# Patient Record
Sex: FEMALE | Race: WHITE | NOT HISPANIC OR LATINO | URBAN - METROPOLITAN AREA
[De-identification: names, ages, dates, MRNs, and addresses within clinical notes are randomized per-mention and may not be internally consistent; named-entity substitution may affect disease eponyms.]

---

## 2017-08-14 ENCOUNTER — EMERGENCY (EMERGENCY)
Facility: HOSPITAL | Age: 32
LOS: 1 days | Discharge: HOME | End: 2017-08-14
Admitting: EMERGENCY MEDICINE
Payer: MEDICARE

## 2017-08-14 VITALS
DIASTOLIC BLOOD PRESSURE: 88 MMHG | SYSTOLIC BLOOD PRESSURE: 129 MMHG | OXYGEN SATURATION: 99 % | TEMPERATURE: 98 F | RESPIRATION RATE: 18 BRPM | HEART RATE: 87 BPM

## 2017-08-14 LAB
ALBUMIN SERPL ELPH-MCNC: 4 G/DL — SIGNIFICANT CHANGE UP (ref 3.4–5)
ALP SERPL-CCNC: 46 U/L — SIGNIFICANT CHANGE UP (ref 40–120)
ALT FLD-CCNC: 15 U/L — SIGNIFICANT CHANGE UP (ref 12–42)
ANION GAP SERPL CALC-SCNC: 4 MMOL/L — LOW (ref 9–16)
APPEARANCE UR: CLEAR — SIGNIFICANT CHANGE UP
AST SERPL-CCNC: 14 U/L — LOW (ref 15–37)
BASOPHILS NFR BLD AUTO: 0.2 % — SIGNIFICANT CHANGE UP (ref 0–2)
BILIRUB SERPL-MCNC: 0.4 MG/DL — SIGNIFICANT CHANGE UP (ref 0.2–1.2)
BILIRUB UR-MCNC: NEGATIVE — SIGNIFICANT CHANGE UP
BUN SERPL-MCNC: 21 MG/DL — SIGNIFICANT CHANGE UP (ref 7–23)
CALCIUM SERPL-MCNC: 9.2 MG/DL — SIGNIFICANT CHANGE UP (ref 8.5–10.5)
CHLORIDE SERPL-SCNC: 104 MMOL/L — SIGNIFICANT CHANGE UP (ref 96–108)
CO2 SERPL-SCNC: 28 MMOL/L — SIGNIFICANT CHANGE UP (ref 22–31)
COLOR SPEC: YELLOW — SIGNIFICANT CHANGE UP
CREAT SERPL-MCNC: 0.84 MG/DL — SIGNIFICANT CHANGE UP (ref 0.5–1.3)
DIFF PNL FLD: NEGATIVE — SIGNIFICANT CHANGE UP
EOSINOPHIL NFR BLD AUTO: 0.3 % — SIGNIFICANT CHANGE UP (ref 0–6)
GLUCOSE SERPL-MCNC: 91 MG/DL — SIGNIFICANT CHANGE UP (ref 70–99)
GLUCOSE UR QL: NEGATIVE — SIGNIFICANT CHANGE UP
HCG UR QL: NEGATIVE — SIGNIFICANT CHANGE UP
HCT VFR BLD CALC: 33.1 % — LOW (ref 34.5–45)
HGB BLD-MCNC: 11.5 G/DL — SIGNIFICANT CHANGE UP (ref 11.5–15.5)
IMM GRANULOCYTES NFR BLD AUTO: 0.3 % — SIGNIFICANT CHANGE UP (ref 0–1.5)
KETONES UR-MCNC: NEGATIVE — SIGNIFICANT CHANGE UP
LEUKOCYTE ESTERASE UR-ACNC: NEGATIVE — SIGNIFICANT CHANGE UP
LYMPHOCYTES # BLD AUTO: 16.4 % — SIGNIFICANT CHANGE UP (ref 13–44)
MCHC RBC-ENTMCNC: 31.9 PG — SIGNIFICANT CHANGE UP (ref 27–34)
MCHC RBC-ENTMCNC: 34.7 G/DL — SIGNIFICANT CHANGE UP (ref 32–36)
MCV RBC AUTO: 91.9 FL — SIGNIFICANT CHANGE UP (ref 80–100)
MONOCYTES NFR BLD AUTO: 7.2 % — SIGNIFICANT CHANGE UP (ref 2–14)
NEUTROPHILS NFR BLD AUTO: 75.6 % — SIGNIFICANT CHANGE UP (ref 43–77)
NITRITE UR-MCNC: NEGATIVE — SIGNIFICANT CHANGE UP
PH UR: 5.5 — SIGNIFICANT CHANGE UP (ref 5–8)
PLATELET # BLD AUTO: 155 K/UL — SIGNIFICANT CHANGE UP (ref 150–400)
POTASSIUM SERPL-MCNC: 3.9 MMOL/L — SIGNIFICANT CHANGE UP (ref 3.5–5.3)
POTASSIUM SERPL-SCNC: 3.9 MMOL/L — SIGNIFICANT CHANGE UP (ref 3.5–5.3)
PROT SERPL-MCNC: 7 G/DL — SIGNIFICANT CHANGE UP (ref 6.4–8.2)
PROT UR-MCNC: NEGATIVE MG/DL — SIGNIFICANT CHANGE UP
RBC # BLD: 3.6 M/UL — LOW (ref 3.8–5.2)
RBC # FLD: 12.3 % — SIGNIFICANT CHANGE UP (ref 10.3–16.9)
SODIUM SERPL-SCNC: 136 MMOL/L — SIGNIFICANT CHANGE UP (ref 132–145)
SP GR SPEC: 1.02 — SIGNIFICANT CHANGE UP (ref 1–1.03)
UROBILINOGEN FLD QL: 0.2 E.U./DL — SIGNIFICANT CHANGE UP
WBC # BLD: 6.1 K/UL — SIGNIFICANT CHANGE UP (ref 3.8–10.5)
WBC # FLD AUTO: 6.1 K/UL — SIGNIFICANT CHANGE UP (ref 3.8–10.5)

## 2017-08-14 PROCEDURE — 99284 EMERGENCY DEPT VISIT MOD MDM: CPT

## 2017-08-14 PROCEDURE — 76705 ECHO EXAM OF ABDOMEN: CPT | Mod: 26

## 2017-08-14 RX ORDER — ONDANSETRON 8 MG/1
4 TABLET, FILM COATED ORAL ONCE
Qty: 0 | Refills: 0 | Status: COMPLETED | OUTPATIENT
Start: 2017-08-14 | End: 2017-08-14

## 2017-08-14 RX ORDER — ONDANSETRON 8 MG/1
1 TABLET, FILM COATED ORAL
Qty: 12 | Refills: 0 | OUTPATIENT
Start: 2017-08-14

## 2017-08-14 RX ORDER — SODIUM CHLORIDE 9 MG/ML
1000 INJECTION INTRAMUSCULAR; INTRAVENOUS; SUBCUTANEOUS ONCE
Qty: 0 | Refills: 0 | Status: COMPLETED | OUTPATIENT
Start: 2017-08-14 | End: 2017-08-14

## 2017-08-14 RX ADMIN — ONDANSETRON 4 MILLIGRAM(S): 8 TABLET, FILM COATED ORAL at 15:16

## 2017-08-14 RX ADMIN — SODIUM CHLORIDE 1000 MILLILITER(S): 9 INJECTION INTRAMUSCULAR; INTRAVENOUS; SUBCUTANEOUS at 15:24

## 2017-08-14 NOTE — ED PROVIDER NOTE - MEDICAL DECISION MAKING DETAILS
Patient is a 33 y/o female with hx of gallstones, chronic GI complaints on liquid diet due to ?dysphagia, presenting to the ED with sudden onset, intermittent RUQ and right flank abdominal pain since this morning with associated nausea. On exam, mild RUQ tenderness and mild right CVA tenderness. Will obtain labs(including LFTs), check urine, and get RUQ US to eval for cholelithiasis vs cholecystitis. Dispo pending workup.

## 2017-08-14 NOTE — ED ADULT NURSE NOTE - OBJECTIVE STATEMENT
Pt reports right flank pain radiating to RUQ and nausea since this am. Pt denies vomiting or diarrhea, denies urinary symptoms

## 2017-08-18 DIAGNOSIS — R10.11 RIGHT UPPER QUADRANT PAIN: ICD-10-CM

## 2017-08-18 DIAGNOSIS — R11.0 NAUSEA: ICD-10-CM

## 2018-06-16 ENCOUNTER — EMERGENCY (EMERGENCY)
Facility: HOSPITAL | Age: 33
LOS: 1 days | Discharge: HOME | End: 2018-06-16
Admitting: EMERGENCY MEDICINE
Payer: MEDICARE

## 2018-06-16 VITALS
SYSTOLIC BLOOD PRESSURE: 110 MMHG | RESPIRATION RATE: 18 BRPM | DIASTOLIC BLOOD PRESSURE: 78 MMHG | HEART RATE: 81 BPM | TEMPERATURE: 98 F | OXYGEN SATURATION: 98 %

## 2018-06-16 PROBLEM — K80.20 CALCULUS OF GALLBLADDER WITHOUT CHOLECYSTITIS WITHOUT OBSTRUCTION: Chronic | Status: ACTIVE | Noted: 2017-08-14

## 2018-06-16 LAB
APPEARANCE UR: CLEAR — SIGNIFICANT CHANGE UP
BILIRUB UR-MCNC: ABNORMAL
COLOR SPEC: YELLOW — SIGNIFICANT CHANGE UP
DIFF PNL FLD: NEGATIVE — SIGNIFICANT CHANGE UP
GLUCOSE UR QL: NEGATIVE — SIGNIFICANT CHANGE UP
HCG UR QL: NEGATIVE — SIGNIFICANT CHANGE UP
KETONES UR-MCNC: NEGATIVE — SIGNIFICANT CHANGE UP
LEUKOCYTE ESTERASE UR-ACNC: ABNORMAL
NITRITE UR-MCNC: NEGATIVE — SIGNIFICANT CHANGE UP
PH UR: 6 — SIGNIFICANT CHANGE UP (ref 5–8)
PROT UR-MCNC: 30 MG/DL
SP GR SPEC: >=1.03 — SIGNIFICANT CHANGE UP (ref 1–1.03)
UROBILINOGEN FLD QL: 1 E.U./DL — SIGNIFICANT CHANGE UP

## 2018-06-16 PROCEDURE — 99283 EMERGENCY DEPT VISIT LOW MDM: CPT

## 2018-06-16 RX ORDER — IPRATROPIUM BROMIDE 0.2 MG/ML
0 SOLUTION, NON-ORAL INHALATION
Qty: 0 | Refills: 0 | COMMUNITY

## 2018-06-16 RX ORDER — GABAPENTIN 400 MG/1
0 CAPSULE ORAL
Qty: 0 | Refills: 0 | COMMUNITY

## 2018-06-16 RX ORDER — CEFUROXIME AXETIL 250 MG
5 TABLET ORAL
Qty: 100 | Refills: 0 | OUTPATIENT
Start: 2018-06-16 | End: 2018-06-25

## 2018-06-16 NOTE — ED PROVIDER NOTE - MUSCULOSKELETAL, MLM
Spine appears normal, range of motion is not limited, no muscle or joint tenderness Spine appears normal, no midline tenderness. no cvat bilaterally

## 2018-06-16 NOTE — ED PROVIDER NOTE - PMH
Asthma    Cerebral palsy    Gall stones    Hypoglycemia    Immunoglobulin A deficiency    Immunoglobulin G deficiency    Small fiber neuropathy

## 2018-06-16 NOTE — ED ADULT TRIAGE NOTE - CHIEF COMPLAINT QUOTE
diagnosed with UTI at [facility] this morning and given rx for cipro liquid. unable to obtain cipro liquid from pharmacy anywhere in city and needs abx changed.

## 2018-06-16 NOTE — ED PROVIDER NOTE - MEDICAL DECISION MAKING DETAILS
Seen at [facility] today, was told she had UTI and started on cipro suspension - unable to locate pharmacy that has this medication in stock, will change rx to ceftin suspension, advised f/u PMD

## 2018-06-16 NOTE — ED ADULT NURSE NOTE - CHIEF COMPLAINT QUOTE
diagnosed with UTI at [facility] this morning and given rx for cipro liquid. unable to obtain cipro liquid from pharmacy anywhere in city and needs abx changed.

## 2018-06-20 DIAGNOSIS — Z76.0 ENCOUNTER FOR ISSUE OF REPEAT PRESCRIPTION: ICD-10-CM

## 2018-06-20 DIAGNOSIS — J45.909 UNSPECIFIED ASTHMA, UNCOMPLICATED: ICD-10-CM

## 2018-06-20 DIAGNOSIS — Z79.2 LONG TERM (CURRENT) USE OF ANTIBIOTICS: ICD-10-CM

## 2018-06-20 DIAGNOSIS — Z88.0 ALLERGY STATUS TO PENICILLIN: ICD-10-CM

## 2018-06-20 DIAGNOSIS — Z79.899 OTHER LONG TERM (CURRENT) DRUG THERAPY: ICD-10-CM

## 2018-06-20 DIAGNOSIS — N39.0 URINARY TRACT INFECTION, SITE NOT SPECIFIED: ICD-10-CM

## 2018-06-20 DIAGNOSIS — Z79.51 LONG TERM (CURRENT) USE OF INHALED STEROIDS: ICD-10-CM

## 2018-10-04 ENCOUNTER — EMERGENCY (EMERGENCY)
Facility: HOSPITAL | Age: 33
LOS: 1 days | Discharge: HOME | End: 2018-10-04
Attending: EMERGENCY MEDICINE | Admitting: EMERGENCY MEDICINE
Payer: MEDICARE

## 2018-10-04 VITALS
DIASTOLIC BLOOD PRESSURE: 78 MMHG | OXYGEN SATURATION: 100 % | TEMPERATURE: 98 F | HEART RATE: 86 BPM | RESPIRATION RATE: 18 BRPM | SYSTOLIC BLOOD PRESSURE: 116 MMHG

## 2018-10-04 DIAGNOSIS — R10.32 LEFT LOWER QUADRANT PAIN: ICD-10-CM

## 2018-10-04 DIAGNOSIS — Z79.899 OTHER LONG TERM (CURRENT) DRUG THERAPY: ICD-10-CM

## 2018-10-04 DIAGNOSIS — R11.0 NAUSEA: ICD-10-CM

## 2018-10-04 DIAGNOSIS — Z88.0 ALLERGY STATUS TO PENICILLIN: ICD-10-CM

## 2018-10-04 DIAGNOSIS — Z88.8 ALLERGY STATUS TO OTHER DRUGS, MEDICAMENTS AND BIOLOGICAL SUBSTANCES STATUS: ICD-10-CM

## 2018-10-04 DIAGNOSIS — K59.00 CONSTIPATION, UNSPECIFIED: ICD-10-CM

## 2018-10-04 PROCEDURE — 99284 EMERGENCY DEPT VISIT MOD MDM: CPT

## 2018-10-05 PROBLEM — G62.9 POLYNEUROPATHY, UNSPECIFIED: Chronic | Status: ACTIVE | Noted: 2018-06-16

## 2018-10-05 PROBLEM — D80.2 SELECTIVE DEFICIENCY OF IMMUNOGLOBULIN A [IGA]: Chronic | Status: ACTIVE | Noted: 2018-06-16

## 2018-10-05 PROBLEM — J45.909 UNSPECIFIED ASTHMA, UNCOMPLICATED: Chronic | Status: ACTIVE | Noted: 2018-06-16

## 2018-10-05 PROBLEM — G80.9 CEREBRAL PALSY, UNSPECIFIED: Chronic | Status: ACTIVE | Noted: 2018-06-16

## 2018-10-05 PROBLEM — E16.2 HYPOGLYCEMIA, UNSPECIFIED: Chronic | Status: ACTIVE | Noted: 2018-06-16

## 2018-10-05 PROBLEM — D80.3 SELECTIVE DEFICIENCY OF IMMUNOGLOBULIN G [IGG] SUBCLASSES: Chronic | Status: ACTIVE | Noted: 2018-06-16

## 2018-10-05 LAB
ALBUMIN SERPL ELPH-MCNC: 4 G/DL — SIGNIFICANT CHANGE UP (ref 3.4–5)
ALP SERPL-CCNC: 61 U/L — SIGNIFICANT CHANGE UP (ref 40–120)
ALT FLD-CCNC: 15 U/L — SIGNIFICANT CHANGE UP (ref 12–42)
ANION GAP SERPL CALC-SCNC: 4 MMOL/L — LOW (ref 9–16)
AST SERPL-CCNC: 13 U/L — LOW (ref 15–37)
BASOPHILS NFR BLD AUTO: 0.2 % — SIGNIFICANT CHANGE UP (ref 0–2)
BILIRUB SERPL-MCNC: 0.4 MG/DL — SIGNIFICANT CHANGE UP (ref 0.2–1.2)
BUN SERPL-MCNC: 10 MG/DL — SIGNIFICANT CHANGE UP (ref 7–23)
CALCIUM SERPL-MCNC: 8.3 MG/DL — LOW (ref 8.5–10.5)
CHLORIDE SERPL-SCNC: 103 MMOL/L — SIGNIFICANT CHANGE UP (ref 96–108)
CO2 SERPL-SCNC: 30 MMOL/L — SIGNIFICANT CHANGE UP (ref 22–31)
CREAT SERPL-MCNC: 0.77 MG/DL — SIGNIFICANT CHANGE UP (ref 0.5–1.3)
EOSINOPHIL NFR BLD AUTO: 0.5 % — SIGNIFICANT CHANGE UP (ref 0–6)
GLUCOSE SERPL-MCNC: 81 MG/DL — SIGNIFICANT CHANGE UP (ref 70–99)
HCT VFR BLD CALC: 33.2 % — LOW (ref 34.5–45)
HGB BLD-MCNC: 11.7 G/DL — SIGNIFICANT CHANGE UP (ref 11.5–15.5)
IMM GRANULOCYTES NFR BLD AUTO: 0.2 % — SIGNIFICANT CHANGE UP (ref 0–1.5)
LYMPHOCYTES # BLD AUTO: 19.3 % — SIGNIFICANT CHANGE UP (ref 13–44)
MCHC RBC-ENTMCNC: 32.2 PG — SIGNIFICANT CHANGE UP (ref 27–34)
MCHC RBC-ENTMCNC: 35.2 G/DL — SIGNIFICANT CHANGE UP (ref 32–36)
MCV RBC AUTO: 91.5 FL — SIGNIFICANT CHANGE UP (ref 80–100)
MONOCYTES NFR BLD AUTO: 10.1 % — SIGNIFICANT CHANGE UP (ref 2–14)
NEUTROPHILS NFR BLD AUTO: 69.7 % — SIGNIFICANT CHANGE UP (ref 43–77)
PLATELET # BLD AUTO: 170 K/UL — SIGNIFICANT CHANGE UP (ref 150–400)
POTASSIUM SERPL-MCNC: 3.3 MMOL/L — LOW (ref 3.5–5.3)
POTASSIUM SERPL-SCNC: 3.3 MMOL/L — LOW (ref 3.5–5.3)
PROT SERPL-MCNC: 7.1 G/DL — SIGNIFICANT CHANGE UP (ref 6.4–8.2)
RBC # BLD: 3.63 M/UL — LOW (ref 3.8–5.2)
RBC # FLD: 12.6 % — SIGNIFICANT CHANGE UP (ref 10.3–14.5)
SODIUM SERPL-SCNC: 137 MMOL/L — SIGNIFICANT CHANGE UP (ref 132–145)
WBC # BLD: 6 K/UL — SIGNIFICANT CHANGE UP (ref 3.8–10.5)
WBC # FLD AUTO: 6 K/UL — SIGNIFICANT CHANGE UP (ref 3.8–10.5)

## 2018-10-05 PROCEDURE — 74176 CT ABD & PELVIS W/O CONTRAST: CPT | Mod: 26

## 2018-10-05 RX ORDER — KETOROLAC TROMETHAMINE 30 MG/ML
30 SYRINGE (ML) INJECTION ONCE
Qty: 0 | Refills: 0 | Status: DISCONTINUED | OUTPATIENT
Start: 2018-10-05 | End: 2018-10-05

## 2018-10-05 RX ORDER — ONDANSETRON 8 MG/1
4 TABLET, FILM COATED ORAL ONCE
Qty: 0 | Refills: 0 | Status: COMPLETED | OUTPATIENT
Start: 2018-10-05 | End: 2018-10-05

## 2018-10-05 RX ORDER — MULTIVIT WITH MIN/MFOLATE/K2 340-15/3 G
150 POWDER (GRAM) ORAL ONCE
Qty: 0 | Refills: 0 | Status: COMPLETED | OUTPATIENT
Start: 2018-10-05 | End: 2018-10-05

## 2018-10-05 RX ORDER — SODIUM CHLORIDE 9 MG/ML
1000 INJECTION INTRAMUSCULAR; INTRAVENOUS; SUBCUTANEOUS ONCE
Qty: 0 | Refills: 0 | Status: COMPLETED | OUTPATIENT
Start: 2018-10-05 | End: 2018-10-05

## 2018-10-05 RX ADMIN — ONDANSETRON 4 MILLIGRAM(S): 8 TABLET, FILM COATED ORAL at 01:43

## 2018-10-05 RX ADMIN — SODIUM CHLORIDE 1000 MILLILITER(S): 9 INJECTION INTRAMUSCULAR; INTRAVENOUS; SUBCUTANEOUS at 01:43

## 2018-10-05 RX ADMIN — Medication 30 MILLIGRAM(S): at 01:43

## 2018-10-05 RX ADMIN — Medication 150 MILLILITER(S): at 03:16

## 2018-10-05 NOTE — ED PROVIDER NOTE - OBJECTIVE STATEMENT
patient with hx of IGM and IGA deficiency, asthma, cerebral palsy, migraine, on zofran, toradol and benadryl as needed, has epipen for food allergies, hx hypoglycemia without diabetes, on hormone replacement therapy, trans female to male, presents with burning dysuria for 3 days, associated with nausea, urinary frequency, and L flank pain radiating into LLQ and groin. denies hematuria, denies vomiting or diarrhea. denies abd pain otherwise. no hx of chronic UTI.

## 2018-10-05 NOTE — ED ADULT NURSE NOTE - OBJECTIVE STATEMENT
pt aox4. neurologically wnl. no sob or difficulty breathing noted. lung sounds clear bilaterally. skin appropriate for ethnicity, warm and dry. cap refill < 2 secs. pulses 2+ and regular. abd rounded soft and nontender. pt c/o L sided flank pain with urinary frequency and pos costovertebral tenderness.

## 2018-10-05 NOTE — ED ADULT NURSE NOTE - NSIMPLEMENTINTERV_GEN_ALL_ED
Implemented All Universal Safety Interventions:  [place] to call system. Call bell, personal items and telephone within reach. Instruct patient to call for assistance. Room bathroom lighting operational. Non-slip footwear when patient is off stretcher. Physically safe environment: no spills, clutter or unnecessary equipment. Stretcher in lowest position, wheels locked, appropriate side rails in place.

## 2018-12-25 NOTE — ED PROVIDER NOTE - PROGRESS NOTE DETAILS
Plan for CXR, XR left ankle and left elbow, CT head, blood work. Labs and abdominal US all wnl. Patient feels well. Will discharge home and advised patient to follow up with her GI doctor. Advised on strict ER return precautions,

## 2019-02-23 ENCOUNTER — INPATIENT (INPATIENT)
Facility: HOSPITAL | Age: 34
LOS: 0 days | Discharge: HOME | DRG: 392 | End: 2019-02-24
Attending: SURGERY | Admitting: SURGERY
Payer: COMMERCIAL

## 2019-02-23 VITALS
OXYGEN SATURATION: 97 % | RESPIRATION RATE: 18 BRPM | DIASTOLIC BLOOD PRESSURE: 76 MMHG | SYSTOLIC BLOOD PRESSURE: 124 MMHG | TEMPERATURE: 98 F | HEART RATE: 78 BPM

## 2019-02-23 LAB
ALBUMIN SERPL ELPH-MCNC: 4.1 G/DL — SIGNIFICANT CHANGE UP (ref 3.4–5)
ALP SERPL-CCNC: 82 U/L — SIGNIFICANT CHANGE UP (ref 40–120)
ALT FLD-CCNC: 13 U/L — SIGNIFICANT CHANGE UP (ref 12–42)
ANION GAP SERPL CALC-SCNC: 9 MMOL/L — SIGNIFICANT CHANGE UP (ref 9–16)
APPEARANCE UR: CLEAR — SIGNIFICANT CHANGE UP
AST SERPL-CCNC: 10 U/L — LOW (ref 15–37)
BASOPHILS NFR BLD AUTO: 0.1 % — SIGNIFICANT CHANGE UP (ref 0–2)
BILIRUB SERPL-MCNC: 0.5 MG/DL — SIGNIFICANT CHANGE UP (ref 0.2–1.2)
BILIRUB UR-MCNC: NEGATIVE — SIGNIFICANT CHANGE UP
BUN SERPL-MCNC: 12 MG/DL — SIGNIFICANT CHANGE UP (ref 7–23)
CALCIUM SERPL-MCNC: 9.3 MG/DL — SIGNIFICANT CHANGE UP (ref 8.5–10.5)
CHLORIDE SERPL-SCNC: 100 MMOL/L — SIGNIFICANT CHANGE UP (ref 96–108)
CO2 SERPL-SCNC: 29 MMOL/L — SIGNIFICANT CHANGE UP (ref 22–31)
COLOR SPEC: YELLOW — SIGNIFICANT CHANGE UP
CREAT SERPL-MCNC: 0.96 MG/DL — SIGNIFICANT CHANGE UP (ref 0.5–1.3)
DIFF PNL FLD: NEGATIVE — SIGNIFICANT CHANGE UP
EOSINOPHIL NFR BLD AUTO: 0 % — SIGNIFICANT CHANGE UP (ref 0–6)
GLUCOSE SERPL-MCNC: 91 MG/DL — SIGNIFICANT CHANGE UP (ref 70–99)
GLUCOSE UR QL: NEGATIVE — SIGNIFICANT CHANGE UP
HCG UR QL: NEGATIVE — SIGNIFICANT CHANGE UP
HCT VFR BLD CALC: 36.2 % — SIGNIFICANT CHANGE UP (ref 34.5–45)
HGB BLD-MCNC: 12.5 G/DL — SIGNIFICANT CHANGE UP (ref 11.5–15.5)
IMM GRANULOCYTES NFR BLD AUTO: 0.3 % — SIGNIFICANT CHANGE UP (ref 0–1.5)
KETONES UR-MCNC: NEGATIVE — SIGNIFICANT CHANGE UP
LEUKOCYTE ESTERASE UR-ACNC: NEGATIVE — SIGNIFICANT CHANGE UP
LYMPHOCYTES # BLD AUTO: 12.8 % — LOW (ref 13–44)
MCHC RBC-ENTMCNC: 31 PG — SIGNIFICANT CHANGE UP (ref 27–34)
MCHC RBC-ENTMCNC: 34.5 G/DL — SIGNIFICANT CHANGE UP (ref 32–36)
MCV RBC AUTO: 89.8 FL — SIGNIFICANT CHANGE UP (ref 80–100)
MONOCYTES NFR BLD AUTO: 8.1 % — SIGNIFICANT CHANGE UP (ref 2–14)
NEUTROPHILS NFR BLD AUTO: 78.7 % — HIGH (ref 43–77)
NITRITE UR-MCNC: NEGATIVE — SIGNIFICANT CHANGE UP
PH UR: 6 — SIGNIFICANT CHANGE UP (ref 5–8)
PLATELET # BLD AUTO: 282 K/UL — SIGNIFICANT CHANGE UP (ref 150–400)
POTASSIUM SERPL-MCNC: 3.1 MMOL/L — LOW (ref 3.5–5.3)
POTASSIUM SERPL-SCNC: 3.1 MMOL/L — LOW (ref 3.5–5.3)
PROT SERPL-MCNC: 7.6 G/DL — SIGNIFICANT CHANGE UP (ref 6.4–8.2)
PROT UR-MCNC: NEGATIVE MG/DL — SIGNIFICANT CHANGE UP
RBC # BLD: 4.03 M/UL — SIGNIFICANT CHANGE UP (ref 3.8–5.2)
RBC # FLD: 12.5 % — SIGNIFICANT CHANGE UP (ref 10.3–14.5)
SODIUM SERPL-SCNC: 138 MMOL/L — SIGNIFICANT CHANGE UP (ref 132–145)
SP GR SPEC: 1.02 — SIGNIFICANT CHANGE UP (ref 1–1.03)
UROBILINOGEN FLD QL: 1 E.U./DL — SIGNIFICANT CHANGE UP
WBC # BLD: 9.6 K/UL — SIGNIFICANT CHANGE UP (ref 3.8–10.5)
WBC # FLD AUTO: 9.6 K/UL — SIGNIFICANT CHANGE UP (ref 3.8–10.5)

## 2019-02-23 PROCEDURE — 99285 EMERGENCY DEPT VISIT HI MDM: CPT

## 2019-02-23 PROCEDURE — 74176 CT ABD & PELVIS W/O CONTRAST: CPT | Mod: 26

## 2019-02-23 RX ORDER — FAMOTIDINE 10 MG/ML
20 INJECTION INTRAVENOUS ONCE
Qty: 0 | Refills: 0 | Status: COMPLETED | OUTPATIENT
Start: 2019-02-23 | End: 2019-02-23

## 2019-02-23 RX ORDER — SODIUM CHLORIDE 9 MG/ML
1000 INJECTION INTRAMUSCULAR; INTRAVENOUS; SUBCUTANEOUS
Qty: 0 | Refills: 0 | Status: DISCONTINUED | OUTPATIENT
Start: 2019-02-23 | End: 2019-02-24

## 2019-02-23 RX ORDER — MORPHINE SULFATE 50 MG/1
4 CAPSULE, EXTENDED RELEASE ORAL ONCE
Qty: 0 | Refills: 0 | Status: DISCONTINUED | OUTPATIENT
Start: 2019-02-23 | End: 2019-02-23

## 2019-02-23 RX ORDER — SODIUM CHLORIDE 9 MG/ML
1000 INJECTION INTRAMUSCULAR; INTRAVENOUS; SUBCUTANEOUS ONCE
Qty: 0 | Refills: 0 | Status: COMPLETED | OUTPATIENT
Start: 2019-02-23 | End: 2019-02-23

## 2019-02-23 RX ORDER — ONDANSETRON 8 MG/1
4 TABLET, FILM COATED ORAL ONCE
Qty: 0 | Refills: 0 | Status: COMPLETED | OUTPATIENT
Start: 2019-02-23 | End: 2019-02-23

## 2019-02-23 RX ORDER — KETOROLAC TROMETHAMINE 30 MG/ML
30 SYRINGE (ML) INJECTION ONCE
Qty: 0 | Refills: 0 | Status: DISCONTINUED | OUTPATIENT
Start: 2019-02-23 | End: 2019-02-23

## 2019-02-23 RX ORDER — POTASSIUM CHLORIDE 20 MEQ
40 PACKET (EA) ORAL ONCE
Qty: 0 | Refills: 0 | Status: COMPLETED | OUTPATIENT
Start: 2019-02-23 | End: 2019-02-23

## 2019-02-23 RX ADMIN — Medication 30 MILLIGRAM(S): at 23:42

## 2019-02-23 RX ADMIN — FAMOTIDINE 20 MILLIGRAM(S): 10 INJECTION INTRAVENOUS at 20:34

## 2019-02-23 RX ADMIN — MORPHINE SULFATE 4 MILLIGRAM(S): 50 CAPSULE, EXTENDED RELEASE ORAL at 23:46

## 2019-02-23 RX ADMIN — SODIUM CHLORIDE 1000 MILLILITER(S): 9 INJECTION INTRAMUSCULAR; INTRAVENOUS; SUBCUTANEOUS at 20:34

## 2019-02-23 RX ADMIN — ONDANSETRON 4 MILLIGRAM(S): 8 TABLET, FILM COATED ORAL at 20:34

## 2019-02-23 RX ADMIN — Medication 30 MILLIGRAM(S): at 20:34

## 2019-02-23 NOTE — ED ADULT NURSE NOTE - NSIMPLEMENTINTERV_GEN_ALL_ED
Implemented All Universal Safety Interventions:  [place] to call system. Call bell, personal items and telephone within reach. Instruct patient to call for assistance. Room bathroom lighting operational. Non-slip footwear when patient is off stretcher. Physically safe environment: no spills, clutter or unnecessary equipment. Stretcher in lowest position, wheels locked, appropriate side rails in place.

## 2019-02-23 NOTE — ED PROVIDER NOTE - CLINICAL SUMMARY MEDICAL DECISION MAKING FREE TEXT BOX
Pt here with LLQ pain while sitting with nausea. Sent here from [facility] for further eval. Will order labs. medications, and CT renal hunt given location of pain.

## 2019-02-23 NOTE — ED PROVIDER NOTE - PROGRESS NOTE DETAILS
CT findings noted (+) intussucection CT findings noted (+) intussusception, d/w dr doan, accepted for admission to surgery service at [facility].

## 2019-02-23 NOTE — ED PROVIDER NOTE - OBJECTIVE STATEMENT
33 y/o Female with a PMHx of IgM deficiency, IgA deficiency, primary Immunodeficiency IgG, cerebral palsy with spastic diplegia, hypoglycemia without DM, migraine, and asthma presents to the ED c/o LLQ tenderness. Pt states she was sitting and had acute sharp LLQ tenderness since earlier today with associated nausea. She went UCC but was sent to the ER for further evaluation. Denies vomiting, and history of kidney stones.

## 2019-02-24 ENCOUNTER — TRANSCRIPTION ENCOUNTER (OUTPATIENT)
Age: 34
End: 2019-02-24

## 2019-02-24 VITALS
OXYGEN SATURATION: 97 % | RESPIRATION RATE: 17 BRPM | TEMPERATURE: 97 F | DIASTOLIC BLOOD PRESSURE: 75 MMHG | HEART RATE: 64 BPM | SYSTOLIC BLOOD PRESSURE: 115 MMHG

## 2019-02-24 LAB
ANION GAP SERPL CALC-SCNC: 9 MMOL/L — SIGNIFICANT CHANGE UP (ref 5–17)
BUN SERPL-MCNC: 13 MG/DL — SIGNIFICANT CHANGE UP (ref 7–23)
CALCIUM SERPL-MCNC: 8.8 MG/DL — SIGNIFICANT CHANGE UP (ref 8.4–10.5)
CHLORIDE SERPL-SCNC: 102 MMOL/L — SIGNIFICANT CHANGE UP (ref 96–108)
CO2 SERPL-SCNC: 26 MMOL/L — SIGNIFICANT CHANGE UP (ref 22–31)
CREAT SERPL-MCNC: 0.85 MG/DL — SIGNIFICANT CHANGE UP (ref 0.5–1.3)
GLUCOSE SERPL-MCNC: 89 MG/DL — SIGNIFICANT CHANGE UP (ref 70–99)
HCT VFR BLD CALC: 32 % — LOW (ref 34.5–45)
HGB BLD-MCNC: 10.8 G/DL — LOW (ref 11.5–15.5)
MAGNESIUM SERPL-MCNC: 1.9 MG/DL — SIGNIFICANT CHANGE UP (ref 1.6–2.6)
MCHC RBC-ENTMCNC: 31.4 PG — SIGNIFICANT CHANGE UP (ref 27–34)
MCHC RBC-ENTMCNC: 33.8 GM/DL — SIGNIFICANT CHANGE UP (ref 32–36)
MCV RBC AUTO: 93 FL — SIGNIFICANT CHANGE UP (ref 80–100)
NRBC # BLD: 0 /100 WBCS — SIGNIFICANT CHANGE UP (ref 0–0)
PHOSPHATE SERPL-MCNC: 3.5 MG/DL — SIGNIFICANT CHANGE UP (ref 2.5–4.5)
PLATELET # BLD AUTO: 219 K/UL — SIGNIFICANT CHANGE UP (ref 150–400)
POTASSIUM SERPL-MCNC: 3.3 MMOL/L — LOW (ref 3.5–5.3)
POTASSIUM SERPL-SCNC: 3.3 MMOL/L — LOW (ref 3.5–5.3)
RBC # BLD: 3.44 M/UL — LOW (ref 3.8–5.2)
RBC # FLD: 12.4 % — SIGNIFICANT CHANGE UP (ref 10.3–14.5)
SODIUM SERPL-SCNC: 137 MMOL/L — SIGNIFICANT CHANGE UP (ref 135–145)
WBC # BLD: 6.97 K/UL — SIGNIFICANT CHANGE UP (ref 3.8–10.5)
WBC # FLD AUTO: 6.97 K/UL — SIGNIFICANT CHANGE UP (ref 3.8–10.5)

## 2019-02-24 PROCEDURE — 80048 BASIC METABOLIC PNL TOTAL CA: CPT

## 2019-02-24 PROCEDURE — 74177 CT ABD & PELVIS W/CONTRAST: CPT

## 2019-02-24 PROCEDURE — 84100 ASSAY OF PHOSPHORUS: CPT

## 2019-02-24 PROCEDURE — 74177 CT ABD & PELVIS W/CONTRAST: CPT | Mod: 26

## 2019-02-24 PROCEDURE — 80053 COMPREHEN METABOLIC PANEL: CPT

## 2019-02-24 PROCEDURE — 96374 THER/PROPH/DIAG INJ IV PUSH: CPT

## 2019-02-24 PROCEDURE — 85025 COMPLETE CBC W/AUTO DIFF WBC: CPT

## 2019-02-24 PROCEDURE — 74176 CT ABD & PELVIS W/O CONTRAST: CPT

## 2019-02-24 PROCEDURE — 96375 TX/PRO/DX INJ NEW DRUG ADDON: CPT

## 2019-02-24 PROCEDURE — 36415 COLL VENOUS BLD VENIPUNCTURE: CPT

## 2019-02-24 PROCEDURE — 83735 ASSAY OF MAGNESIUM: CPT

## 2019-02-24 PROCEDURE — 99285 EMERGENCY DEPT VISIT HI MDM: CPT | Mod: 25

## 2019-02-24 PROCEDURE — 81003 URINALYSIS AUTO W/O SCOPE: CPT

## 2019-02-24 PROCEDURE — 85027 COMPLETE CBC AUTOMATED: CPT

## 2019-02-24 PROCEDURE — 81025 URINE PREGNANCY TEST: CPT

## 2019-02-24 RX ORDER — IOHEXOL 300 MG/ML
30 INJECTION, SOLUTION INTRAVENOUS ONCE
Qty: 0 | Refills: 0 | Status: COMPLETED | OUTPATIENT
Start: 2019-02-24 | End: 2019-02-24

## 2019-02-24 RX ORDER — ACETAMINOPHEN 500 MG
1000 TABLET ORAL ONCE
Qty: 0 | Refills: 0 | Status: COMPLETED | OUTPATIENT
Start: 2019-02-24 | End: 2019-02-24

## 2019-02-24 RX ORDER — POTASSIUM CHLORIDE 20 MEQ
10 PACKET (EA) ORAL
Qty: 0 | Refills: 0 | Status: COMPLETED | OUTPATIENT
Start: 2019-02-24 | End: 2019-02-24

## 2019-02-24 RX ORDER — SODIUM CHLORIDE 9 MG/ML
1000 INJECTION, SOLUTION INTRAVENOUS
Qty: 0 | Refills: 0 | Status: DISCONTINUED | OUTPATIENT
Start: 2019-02-24 | End: 2019-02-24

## 2019-02-24 RX ORDER — ONDANSETRON 8 MG/1
4 TABLET, FILM COATED ORAL EVERY 8 HOURS
Qty: 0 | Refills: 0 | Status: DISCONTINUED | OUTPATIENT
Start: 2019-02-24 | End: 2019-02-24

## 2019-02-24 RX ORDER — HEPARIN SODIUM 5000 [USP'U]/ML
5000 INJECTION INTRAVENOUS; SUBCUTANEOUS EVERY 8 HOURS
Qty: 0 | Refills: 0 | Status: DISCONTINUED | OUTPATIENT
Start: 2019-02-24 | End: 2019-02-24

## 2019-02-24 RX ORDER — IPRATROPIUM BROMIDE 0.2 MG/ML
1 SOLUTION, NON-ORAL INHALATION EVERY 6 HOURS
Qty: 0 | Refills: 0 | Status: DISCONTINUED | OUTPATIENT
Start: 2019-02-24 | End: 2019-02-24

## 2019-02-24 RX ORDER — LEVALBUTEROL 1.25 MG/.5ML
0 SOLUTION, CONCENTRATE RESPIRATORY (INHALATION)
Qty: 0 | Refills: 0 | COMMUNITY

## 2019-02-24 RX ADMIN — Medication 1000 MILLIGRAM(S): at 03:52

## 2019-02-24 RX ADMIN — SODIUM CHLORIDE 110 MILLILITER(S): 9 INJECTION, SOLUTION INTRAVENOUS at 03:15

## 2019-02-24 RX ADMIN — ONDANSETRON 4 MILLIGRAM(S): 8 TABLET, FILM COATED ORAL at 05:06

## 2019-02-24 RX ADMIN — Medication 400 MILLIGRAM(S): at 03:22

## 2019-02-24 RX ADMIN — IOHEXOL 30 MILLILITER(S): 300 INJECTION, SOLUTION INTRAVENOUS at 05:06

## 2019-02-24 RX ADMIN — Medication 100 MILLIEQUIVALENT(S): at 08:44

## 2019-02-24 RX ADMIN — HEPARIN SODIUM 5000 UNIT(S): 5000 INJECTION INTRAVENOUS; SUBCUTANEOUS at 05:06

## 2019-02-24 RX ADMIN — Medication 100 MILLIEQUIVALENT(S): at 19:03

## 2019-02-24 RX ADMIN — Medication 100 MILLIEQUIVALENT(S): at 15:36

## 2019-02-24 RX ADMIN — Medication 100 MILLIEQUIVALENT(S): at 04:32

## 2019-02-24 RX ADMIN — HEPARIN SODIUM 5000 UNIT(S): 5000 INJECTION INTRAVENOUS; SUBCUTANEOUS at 14:27

## 2019-02-24 RX ADMIN — Medication 100 MILLIEQUIVALENT(S): at 03:28

## 2019-02-24 RX ADMIN — ONDANSETRON 4 MILLIGRAM(S): 8 TABLET, FILM COATED ORAL at 12:16

## 2019-02-24 RX ADMIN — Medication 100 MILLIEQUIVALENT(S): at 14:25

## 2019-02-24 RX ADMIN — SODIUM CHLORIDE 110 MILLILITER(S): 9 INJECTION, SOLUTION INTRAVENOUS at 11:10

## 2019-02-24 NOTE — DISCHARGE NOTE ADULT - CARE PLAN
Principal Discharge DX:	Abdominal pain  Goal:	Recovery  Assessment and plan of treatment:	-Please see Dr Minor in 1-2 weeks to evaluate your recovery.  -Keep your appointment with your endocrinologist this week. Make an appointment with your gastroenterologist as well.  -Please slowly introduce your elemental formula back to your diet. If you supplement with other foods, start with soft, bland foods and advance as tolerated.

## 2019-02-24 NOTE — PROGRESS NOTE ADULT - SUBJECTIVE AND OBJECTIVE BOX
SUBJECTIVE: Patient seen and examined bedside by chief resident. complains of left lower quadrant pain. denies passing gas or having bowel movement. denies nausea and vomiting     heparin  Injectable 5000 Unit(s) SubCutaneous every 8 hours      Vital Signs Last 24 Hrs  T(C): 37.1 (2019 08:28), Max: 37.1 (2019 08:28)  T(F): 98.7 (2019 08:28), Max: 98.7 (2019 08:28)  HR: 65 (2019 08:28) (65 - 78)  BP: 112/77 (2019 08:28) (108/75 - 131/85)  BP(mean): --  RR: 16 (2019 08:28) (16 - 18)  SpO2: 98% (2019 08:28) (97% - 100%)  I&O's Detail    2019 07:01  -  2019 07:00  --------------------------------------------------------  IN:    lactated ringers.: 880 mL  Total IN: 880 mL    OUT:  Total OUT: 0 mL    Total NET: 880 mL      2019 07:01  -  2019 13:03  --------------------------------------------------------  IN:    IV PiggyBack: 100 mL    lactated ringers.: 550 mL  Total IN: 650 mL    OUT:  Total OUT: 0 mL    Total NET: 650 mL          General: NAD, resting comfortably in bed  C/V: NSR  Pulm: Nonlabored breathing, no respiratory distress  Abd: soft, ND, tenderness to palpation in LLQ  Extrem: WWP, no edema, SCDs in place        LABS:                        10.8   6.97  )-----------( 219      ( 2019 06:20 )             32.0     02-24    137  |  102  |  13  ----------------------------<  89  3.3<L>   |  26  |  0.85    Ca    8.8      2019 06:20  Phos  3.5     02-  Mg     1.9     -    TPro  7.6  /  Alb  4.1  /  TBili  0.5  /  DBili  x   /  AST  10<L>  /  ALT  13  /  AlkPhos  82        Urinalysis Basic - ( 2019 20:33 )    Color: Yellow / Appearance: Clear / S.025 / pH: x  Gluc: x / Ketone: NEGATIVE  / Bili: NEGATIVE / Urobili: 1.0 E.U./dL   Blood: x / Protein: NEGATIVE mg/dL / Nitrite: NEGATIVE   Leuk Esterase: NEGATIVE / RBC: x / WBC x   Sq Epi: x / Non Sq Epi: x / Bacteria: x        RADIOLOGY & ADDITIONAL STUDIES:

## 2019-02-24 NOTE — H&P ADULT - ASSESSMENT
33 yo F (male at birth) w/ hx dysphagia (cause unknown), small fiber neuropathy, IgM deficiency, IgA deficiency, primary IgG immunodeficiency, cerebral palsy with spastic diplegia, hypoglycemia without diabetes, migraines, asthma, cholelithiasis, and multiple food/medication allergies presenting as a transfer from [facility] w/ c/f intussusception.     - admit to regional surgery under Dr. Minor  - NPO/IVF 35 yo F (male at birth) w/ hx dysphagia (cause unknown), small fiber neuropathy, IgM deficiency, IgA deficiency, primary IgG immunodeficiency, cerebral palsy with spastic diplegia, hypoglycemia without diabetes, migraines, asthma, cholelithiasis, and multiple food/medication allergies presenting as a transfer from [facility] w/ c/f intussusception.     - admit to regional surgery under Dr. Minor  - pain control  - NPO/IVF  - repeat CT w/ PO/IV contrast  - replete K  - SCDs/SQH

## 2019-02-24 NOTE — H&P ADULT - NSHPPHYSICALEXAM_GEN_ALL_CORE
General: NAD  Pulm: normal resp effort and excursion  Abd: soft, non-distended, LUQ/flank TTP w/ rebound, no guarding

## 2019-02-24 NOTE — DISCHARGE NOTE ADULT - CARE PROVIDER_API CALL
Sarina Minor (MD)  Surgery  [address], [address]  [place], [place] [address]  Phone: [phone]  Fax: [phone]  Follow Up Time:

## 2019-02-24 NOTE — DISCHARGE NOTE ADULT - PATIENT PORTAL LINK FT
You can access the [facility] Patient Portal, offered by [facility], by registering with the following website: [email or web address]

## 2019-02-24 NOTE — PROGRESS NOTE ADULT - ASSESSMENT
33 yo F (male at birth) w/ hx dysphagia (cause unknown), small fiber neuropathy, IgM deficiency, IgA deficiency, primary IgG immunodeficiency, cerebral palsy with spastic diplegia, hypoglycemia without diabetes, migraines, asthma, cholelithiasis, and multiple food/medication allergies presenting as a transfer from [facility] w/ c/f intussusception.     - admit to regional surgery under Dr. Minor  - pain control  - NPO/IVF  - repeat CT w/ PO/IV contrast  - replete K  - SCDs/SQH

## 2019-02-24 NOTE — DISCHARGE NOTE ADULT - MEDICATION SUMMARY - MEDICATIONS TO TAKE
I will START or STAY ON the medications listed below when I get home from the hospital:    gabapentin 250 mg/5 mL oral solution  -- Indication: For Pain    Atrovent HFA 17 mcg/inh inhalation aerosol  -- Indication: For Asthma

## 2019-02-24 NOTE — DISCHARGE NOTE ADULT - HOSPITAL COURSE
35 yo transgender woman w/ hx dysphagia (cause unknown), small fiber neuropathy, IgM deficiency, IgA deficiency, primary IgG immunodeficiency, cerebral palsy with spastic diplegia, hypoglycemia without diabetes, migraines, asthma, cholelithiasis, and multiple food/medication allergies presenting as a transfer from [facility] w/ c/f intussusception. Patient was at [facility] today when felt a sharp pain in her L flank that has persisted and is of continuous nature. She is on an elemental feeding formula for her food allergies and states her BMs are usually watery. Her last was 2/21, and she states this is not an abnormal time interval for her to go without a BM (usually 2 small BMs/week). Does not remember last time passed gas. She denies melena or hematochezia. She endorses nausea but denies emesis, fevers, chills, hematuria, dysuria, CP, SOB. She has had multiple EGDs and at least one "partial colonoscopy," most recently last year as part of a dysphagia workup, but no abnormalities were found. She denies family hx of IBD or bowel cancers. Of note, patient is allergic to or has an intolerance to: penicillin, thimerosal, sumatriptan, PPV23, apples, dairy, eggs, gluten, soy, and Tegaderm. On admission, her CT demonstrated a concern for intussusception; however, a repeat CT scan was performed with PO and IV contrast and demonstrated NO intussusception and NO small bowel obstruction. She was diagnosed with a likely viral gastroenteritis and discharged on a clear liquid diet with instructions to advance to her elemental feeding formula at home. She was recommended to follow up with Dr. Minor and her endocrinologist and gastroenterologist in 1-2 weeks.

## 2019-02-24 NOTE — H&P ADULT - HISTORY OF PRESENT ILLNESS
35 yo F (male at birth) w/ hx dysphagia (cause unknown), small fiber neuropathy, IgM deficiency, IgA deficiency, primary IgG immunodeficiency, cerebral palsy with spastic diplegia, hypoglycemia without diabetes, migraines, asthma, cholelithiasis, and multiple food/medication allergies presenting as a transfer from [facility] w/ c/f intussusception. Patient was at [facility] today when felt a sharp pain in her L flank that has persisted and is of continuous nature. She is on an elemental feeding formula for her food allergies and states her BMs are usually watery. Her last was 2/21, and she states this is not an abnormal time interval for her to go without a BM. She denies melena or hematochezia. She endorses nausea but denies emesis, fevers, chills, hematuria, dysuria, CP, SOB. She has had multiple EGDs and at least one "partial colonoscopy," most recently last year as part of a dysphagia workup, but no abnormalities were found. She denies family hx of IBD or bowel cancers.     Of note, patient is allergic to or has an intolerance to: penicillin, thimerosal, sumatriptan, PPV23, apples, dairy, eggs, gluten, soy, and Tegaderm.    The elemental formula she uses for nutrition is: Neocate Jr/prebiotics - unflavored, 12 scoops mixed with 8 oz water    At [facility] pt AVSS, normal WBC, negative UA, CT significant for "3.0 cm segment of telescoping jejunum in the left upper quadrant   possibly representing intussusception without evidence of proximal obstruction." 33 yo F (male at birth) w/ hx dysphagia (cause unknown), small fiber neuropathy, IgM deficiency, IgA deficiency, primary IgG immunodeficiency, cerebral palsy with spastic diplegia, hypoglycemia without diabetes, migraines, asthma, cholelithiasis, and multiple food/medication allergies presenting as a transfer from [facility] w/ c/f intussusception. Patient was at [facility] today when felt a sharp pain in her L flank that has persisted and is of continuous nature. She is on an elemental feeding formula for her food allergies and states her BMs are usually watery. Her last was 2/21, and she states this is not an abnormal time interval for her to go without a BM (usually 2 small BMs/week). Does not remember last time passed gas. She denies melena or hematochezia. She endorses nausea but denies emesis, fevers, chills, hematuria, dysuria, CP, SOB. She has had multiple EGDs and at least one "partial colonoscopy," most recently last year as part of a dysphagia workup, but no abnormalities were found. She denies family hx of IBD or bowel cancers.     Of note, patient is allergic to or has an intolerance to: penicillin, thimerosal, sumatriptan, PPV23, apples, dairy, eggs, gluten, soy, and Tegaderm.    The elemental formula she uses for nutrition is: Neocate Jr/prebiotics - unflavored, 12 scoops mixed with 8 oz water    At [facility] pt AVSS, normal WBC, negative UA, CT non-con significant for "3.0 cm segment of telescoping jejunum in the left upper quadrant   possibly representing intussusception without evidence of proximal obstruction."

## 2019-02-24 NOTE — DISCHARGE NOTE ADULT - PLAN OF CARE
Recovery -Please see Dr Minor in 1-2 weeks to evaluate your recovery.  -Keep your appointment with your endocrinologist this week. Make an appointment with your gastroenterologist as well.  -Please slowly introduce your elemental formula back to your diet. If you supplement with other foods, start with soft, bland foods and advance as tolerated.

## 2019-02-24 NOTE — H&P ADULT - NSHPLABSRESULTS_GEN_ALL_CORE
12.5   9.6   )-----------( 282      ( 23 Feb 2019 20:33 )             36.2   02-23    138  |  100  |  12  ----------------------------<  91  3.1<L>   |  29  |  0.96    Ca    9.3      23 Feb 2019 20:33    TPro  7.6  /  Alb  4.1  /  TBili  0.5  /  DBili  x   /  AST  10<L>  /  ALT  13  /  AlkPhos  82  02-23    CT (2/23):  IMPRESSION:  1.  No renal or ureteral calculi or evidence of obstructive uropathy.  2.  3.0 cm segment of telescoping jejunum in the left upper quadrant   possibly representing intussusception without evidence of proximal   obstruction.

## 2019-03-05 DIAGNOSIS — G80.9 CEREBRAL PALSY, UNSPECIFIED: ICD-10-CM

## 2019-03-05 DIAGNOSIS — Z88.9 ALLERGY STATUS TO UNSPECIFIED DRUGS, MEDICAMENTS AND BIOLOGICAL SUBSTANCES: ICD-10-CM

## 2019-03-05 DIAGNOSIS — G62.9 POLYNEUROPATHY, UNSPECIFIED: ICD-10-CM

## 2019-03-05 DIAGNOSIS — E16.2 HYPOGLYCEMIA, UNSPECIFIED: ICD-10-CM

## 2019-03-05 DIAGNOSIS — D80.4 SELECTIVE DEFICIENCY OF IMMUNOGLOBULIN M [IGM]: ICD-10-CM

## 2019-03-05 DIAGNOSIS — G71.2 CONGENITAL MYOPATHIES: ICD-10-CM

## 2019-03-05 DIAGNOSIS — Z91.018 ALLERGY TO OTHER FOODS: ICD-10-CM

## 2019-03-05 DIAGNOSIS — K90.49 MALABSORPTION DUE TO INTOLERANCE, NOT ELSEWHERE CLASSIFIED: ICD-10-CM

## 2019-03-05 DIAGNOSIS — G43.909 MIGRAINE, UNSPECIFIED, NOT INTRACTABLE, WITHOUT STATUS MIGRAINOSUS: ICD-10-CM

## 2019-03-05 DIAGNOSIS — Z88.0 ALLERGY STATUS TO PENICILLIN: ICD-10-CM

## 2019-03-05 DIAGNOSIS — A08.4 VIRAL INTESTINAL INFECTION, UNSPECIFIED: ICD-10-CM

## 2019-03-05 DIAGNOSIS — K90.41 NON-CELIAC GLUTEN SENSITIVITY: ICD-10-CM

## 2019-03-05 DIAGNOSIS — R10.9 UNSPECIFIED ABDOMINAL PAIN: ICD-10-CM

## 2019-03-05 DIAGNOSIS — J45.909 UNSPECIFIED ASTHMA, UNCOMPLICATED: ICD-10-CM

## 2019-03-05 DIAGNOSIS — G80.1 SPASTIC DIPLEGIC CEREBRAL PALSY: ICD-10-CM

## 2019-03-05 DIAGNOSIS — D80.2 SELECTIVE DEFICIENCY OF IMMUNOGLOBULIN A [IGA]: ICD-10-CM

## 2019-03-05 DIAGNOSIS — Z91.012 ALLERGY TO EGGS: ICD-10-CM

## 2019-04-20 NOTE — ED PROVIDER NOTE - NEURO NEGATIVE STATEMENT, MLM
85 y/o Male presenting to the ED ambulatory, A&Ox3, complaining of bilateral leg weakness after an episode of weakness and dizziness on Thursday. Pt reports standing up and feeling very dizzy on Thursday and states "I felt my blood pressure drop". Pt reports having this happen in the past but this time the weakness stayed in his legs and he has been lethargic ever since. Pt has a pacemaker and got nervous so he came to the ED to get checked out, pt reports having a cardiology appointment on Monday. Pupils 3mm PERRL. Equal strength and sensation in all extremities. Steady gait noted. no loss of consciousness, no gait abnormality, no headache, no sensory deficits, and no weakness. 85 y/o Male presenting to the ED ambulatory, A&Ox3, complaining of bilateral leg weakness after an episode of weakness and dizziness on Thursday. Pt reports standing up and feeling very dizzy on Thursday and states "I felt my blood pressure drop". Pt reports having this happen in the past but this time the weakness stayed in his legs and he has been lethargic ever since. Pt has a pacemaker and got nervous so he came to the ED to get checked out, pt reports having a cardiology appointment on Monday. Pupils 3mm PERRL. Equal strength and sensation in all extremities. Steady gait noted. Pt denies chest pain, shortness of breath, palpitations, numbness, tingling, headache, changes in vision, current dizziness, N/V/D, altered mental status, abdominal pain, back pain, fever, chills, cough, congestions, dysuria, hematuria. Pt family reports he has been more tired than usual lately. Pt family denies any noted altered mental status. Orthostatic blood pressures taken and documented in sunrise. Pt denies any pain at this time. Safety and comfort measures provided. Awaiting MD evaluation. Call bell within reach. Family at bedside.

## 2019-10-22 ENCOUNTER — FORM ENCOUNTER (OUTPATIENT)
Age: 34
End: 2019-10-22

## 2019-10-22 PROBLEM — Z00.00 ENCOUNTER FOR PREVENTIVE HEALTH EXAMINATION: Status: ACTIVE | Noted: 2019-10-22

## 2019-10-23 ENCOUNTER — APPOINTMENT (OUTPATIENT)
Dept: RADIOLOGY | Facility: CLINIC | Age: 34
End: 2019-10-23

## 2019-10-23 ENCOUNTER — APPOINTMENT (OUTPATIENT)
Dept: ORTHOPEDIC SURGERY | Facility: CLINIC | Age: 34
End: 2019-10-23
Payer: MEDICARE

## 2019-10-23 ENCOUNTER — OUTPATIENT (OUTPATIENT)
Dept: OUTPATIENT SERVICES | Facility: HOSPITAL | Age: 34
LOS: 1 days | End: 2019-10-23
Payer: MEDICARE

## 2019-10-23 VITALS — WEIGHT: 130 LBS | BODY MASS INDEX: 21.66 KG/M2 | HEIGHT: 65 IN | RESPIRATION RATE: 16 BRPM

## 2019-10-23 DIAGNOSIS — Q74.1 CONGENITAL MALFORMATION OF KNEE: ICD-10-CM

## 2019-10-23 DIAGNOSIS — M23.92 UNSPECIFIED INTERNAL DERANGEMENT OF LEFT KNEE: ICD-10-CM

## 2019-10-23 DIAGNOSIS — Z86.69 PERSONAL HISTORY OF OTHER DISEASES OF THE NERVOUS SYSTEM AND SENSE ORGANS: ICD-10-CM

## 2019-10-23 DIAGNOSIS — M23.91 UNSPECIFIED INTERNAL DERANGEMENT OF RIGHT KNEE: ICD-10-CM

## 2019-10-23 DIAGNOSIS — M22.2X1 PATELLOFEMORAL DISORDERS, RIGHT KNEE: ICD-10-CM

## 2019-10-23 DIAGNOSIS — Z87.09 PERSONAL HISTORY OF OTHER DISEASES OF THE RESPIRATORY SYSTEM: ICD-10-CM

## 2019-10-23 DIAGNOSIS — M22.2X2 PATELLOFEMORAL DISORDERS, LEFT KNEE: ICD-10-CM

## 2019-10-23 DIAGNOSIS — Q65.89 OTHER SPECIFIED CONGENITAL DEFORMITIES OF HIP: ICD-10-CM

## 2019-10-23 PROCEDURE — 99204 OFFICE O/P NEW MOD 45 MIN: CPT

## 2019-10-23 PROCEDURE — 73562 X-RAY EXAM OF KNEE 3: CPT | Mod: 26,50

## 2019-10-23 PROCEDURE — 73562 X-RAY EXAM OF KNEE 3: CPT

## 2021-06-03 NOTE — PATIENT PROFILE ADULT - NSPROMUTINFOINDIVIDFT_GEN_A_NUR
Intussusception

## 2024-03-04 NOTE — ED PROVIDER NOTE - OBJECTIVE STATEMENT
Patient is a 31 y/o female with hx of chronic GI complaints on liquid diet, previous gallstones, presenting to the ED here with intermittent, sharp RUQ and right flank abdominal pain starting at 6 am this morning. Also endorses nausea. Was seen by PMD who referred her to ED for further evaluation. Denies fever, chills, dizziness, syncope, chest pain, sob, palpitations, cough, vomiting, diarrhea, urinary complaints. Denies previous abdominal surgeries.
VTE Assessment already completed for this visit

## 2024-03-25 ENCOUNTER — NON-APPOINTMENT (OUTPATIENT)
Age: 39
End: 2024-03-25

## 2024-04-01 ENCOUNTER — APPOINTMENT (OUTPATIENT)
Dept: OTOLARYNGOLOGY | Facility: CLINIC | Age: 39
End: 2024-04-01
Payer: MEDICARE

## 2024-04-01 VITALS — WEIGHT: 160 LBS | BODY MASS INDEX: 25.71 KG/M2 | HEIGHT: 66 IN

## 2024-04-01 DIAGNOSIS — H93.299 OTHER ABNORMAL AUDITORY PERCEPTIONS, UNSPECIFIED EAR: ICD-10-CM

## 2024-04-01 DIAGNOSIS — H61.22 IMPACTED CERUMEN, LEFT EAR: ICD-10-CM

## 2024-04-01 PROCEDURE — 99204 OFFICE O/P NEW MOD 45 MIN: CPT | Mod: 25

## 2024-04-01 PROCEDURE — 69210 REMOVE IMPACTED EAR WAX UNI: CPT

## 2024-04-01 NOTE — HISTORY OF PRESENT ILLNESS
[de-identified] : CC: cerumen impaction   HISTORY OF PRESENT ILLNESS: Alyson Gupta is a 40 y/o female who presents today with cerumen impaction on the L side      REVIEW OF SYSTEMS: General ROS: negative for - chills, fatigue or fever Psychological ROS: negative for - anxiety or depression Ophthalmic ROS: negative for - blurry vision, decreased vision or double vision ENT ROS: negative except as noted from HPI Allergy and Immunology ROS: negative except as noted from HPI Hematological and Lymphatic ROS: negative for - bleeding problems Endocrine ROS: negative for - malaise/lethargy Respiratory ROS: negative for - stridor Cardiovascular ROS: negative for - chest pain Gastrointestinal ROS: negative for - appetite loss or nausea/vomiting Genitourinary ROS: negative for - incontinence Musculoskeletal ROS: negative for - gait disturbance Neurological ROS: negative for - behavioral changes Dermatological ROS: negative for - nail changes   Physical Exam:   GENERAL APPEARANCE: Well-developed and No Acute Distress. COMMUNICATION: Able to Communicate. Strong Voice.   HEAD AND FACE Eyes: Testing of ocular motility including primary gaze alignment normal. Inspection and Appearance: No evidence of lesions or masses Palpation: Palpation of the face reveals no sinus tenderness Salivary Glands: Symmetric without masses Facial Strength: Symmetric without evidence of facial paralysis   EAR, NOSE, MOUTH, and THROAT: Ear Canals and Tympanic Membranes, Bilateral: No evidence of inflammation or lesions. Thresholds: Clinical speech reception thresholds normal. External, Nose and Auricle: No lesions or masses. Nasal, Mucosa, Septum, and Turbinates: See endoscopy.   NECK: Evaluation: No evidence of masses or crepitus. The neck is symmetric and the trachea is in the midline position. Thyroid: No evidence of enlargement, tenderness or mass. Neck Lymph Nodes: WNL. Respiratory: Inspection of the chest including symmetry, expansion and/or assessment of respiratory effort normal. Cardiovascular: Evaluation of peripheral vascular system by observation and palpation of capillary refill, normal. Neurological/Psychiatric: Alert, Oriented, Mood, and Affect Normal.   PROCEDURE: Removal impacted cerumen requiring instrumentation. (22016)   PREOPERATIVE DIAGNOSIS: Cerumen Impaction   POSTOPERATIVE DIAGNOSIS Dx: Same   INDICATION FOR PROCEDURE: Patient has noted fullness and hearing loss in the affected ears for significant period of time.   EXAM FINDINGS: Auditory canal(s) was obstructed with cerumen. Cerumen was observed with the microscope after the ear speculum was placed in the EAC, and gently loosened with the cerumen loop circumferentially. The cerumen was then removed using suction, cerumen loop, and irrigation. The tympanic membranes are intact following the procedure. Auditory canals appear minimally inflamed.   Left ear: CI removed, TMI Right ear: clear, TMI   IMPRESSION: Alyson Gupta is a 40 y/o female who presents today with AD CI s/p removal   PLAN:  - mineral oil PRN - RTC PRN   Cedrick Hale MD [facility] Rhinology and Skull Base Surgery Department of Otolaryngology- Head and Neck Surgery [facility]

## 2024-07-30 ENCOUNTER — EMERGENCY (EMERGENCY)
Facility: HOSPITAL | Age: 39
LOS: 1 days | Discharge: HOME | End: 2024-07-30
Admitting: EMERGENCY MEDICINE
Payer: MEDICARE

## 2024-07-30 VITALS
DIASTOLIC BLOOD PRESSURE: 78 MMHG | HEART RATE: 98 BPM | TEMPERATURE: 98 F | WEIGHT: 160.06 LBS | SYSTOLIC BLOOD PRESSURE: 133 MMHG | RESPIRATION RATE: 16 BRPM | OXYGEN SATURATION: 97 %

## 2024-07-30 DIAGNOSIS — Z88.0 ALLERGY STATUS TO PENICILLIN: ICD-10-CM

## 2024-07-30 DIAGNOSIS — Z91.018 ALLERGY TO OTHER FOODS: ICD-10-CM

## 2024-07-30 DIAGNOSIS — Z91.040 LATEX ALLERGY STATUS: ICD-10-CM

## 2024-07-30 DIAGNOSIS — L03.116 CELLULITIS OF LEFT LOWER LIMB: ICD-10-CM

## 2024-07-30 DIAGNOSIS — Z91.010 ALLERGY TO PEANUTS: ICD-10-CM

## 2024-07-30 DIAGNOSIS — Z88.8 ALLERGY STATUS TO OTHER DRUGS, MEDICAMENTS AND BIOLOGICAL SUBSTANCES: ICD-10-CM

## 2024-07-30 PROCEDURE — 99283 EMERGENCY DEPT VISIT LOW MDM: CPT

## 2024-07-30 RX ORDER — CEPHALEXIN 500 MG
10 CAPSULE ORAL
Qty: 2 | Refills: 0
Start: 2024-07-30 | End: 2024-08-08

## 2024-07-30 RX ORDER — CEPHALEXIN 500 MG
500 CAPSULE ORAL ONCE
Refills: 0 | Status: COMPLETED | OUTPATIENT
Start: 2024-07-30 | End: 2024-07-30

## 2024-07-30 RX ADMIN — Medication 500 MILLIGRAM(S): at 21:32

## 2024-07-30 NOTE — ED PROVIDER NOTE - CLINICAL SUMMARY MEDICAL DECISION MAKING FREE TEXT BOX
Pt is well appearing no fevers, no chills here for wound check of L thigh cellulitis pt had I&D at [facility] last night and spread of cellulitis along the L inner thigh over the last 24 hr but has not taken abx prior to the spread and has since taken only 1 dose of abx, pt's abscess is draining and she has 2-3 cm area of induration and mild erythema with ttp, but no streaking, no fevers, no chills, no SIRS -- exam consistent with simple cellulitis of the L inner thigh with associated abscess through mutual decision making agree will add Keflex (no evidence of IgE mediated allergic reaction to penicillin) and area demarcated, pt was advised to cont warm wet soaks over the next 7 days and return in 48 hr for wound check,

## 2024-07-30 NOTE — ED PROVIDER NOTE - PATIENT PORTAL LINK FT
134.7 You can access the FollowMyHealth Patient Portal offered by [facility] by registering at the following website: [email or web address]. By joining [employer]’s FollowMyHealth portal, you will also be able to view your health information using other applications (apps) compatible with our system.

## 2024-07-30 NOTE — ED ADULT NURSE NOTE - CHPI ED NUR SYMPTOMS NEG
no bleeding/no bleeding at site/no chills/no drainage/no fever/no purulent drainage/no rectal pain/no redness

## 2024-07-30 NOTE — ED ADULT NURSE NOTE - NSFALLUNIVINTERV_ED_ALL_ED
Bed/Stretcher in lowest position, wheels locked, appropriate side rails in place/Call bell, personal items and telephone in reach/Instruct patient to call for assistance before getting out of bed/chair/stretcher/Non-slip footwear applied when patient is off stretcher/[place] to call system/Physically safe environment - no spills, clutter or unnecessary equipment/Purposeful proactive rounding/Room/bathroom lighting operational, light cord in reach

## 2024-07-30 NOTE — ED PROVIDER NOTE - NSFOLLOWUPINSTRUCTIONS_ED_ALL_ED_FT
Cellulitis -- return in 48 hours for wound check or if you have fevers, worsening pain at the site, difficulty walking     Cellulitis is a skin infection caused by bacteria. This condition occurs most often in the arms and lower legs but can occur anywhere over the body. Symptoms include redness, swelling, warm skin, tenderness, and chills/fever. If you were prescribed an antibiotic medicine, take it as told by your health care provider. Do not stop taking the antibiotic even if you start to feel better.    SEEK IMMEDIATE MEDICAL CARE IF YOU HAVE THE FOLLOWING SYMPTOMS: worsening fever, red streaks coming from affected area, vomiting or diarrhea, or dizziness/lightheadedness.

## 2024-07-30 NOTE — ED ADULT TRIAGE NOTE - MODE OF ARRIVAL
You sent this in to [facility] on 9/17/20 however transmission to pharmacy failed. Please send again. Walk in

## 2024-07-30 NOTE — ED PROVIDER NOTE - NS_EDPROVIDERDISPOUSERTYPE_ED_A_ED
1.  Stop using new soap or any products to the vaginal area.  2.  Use hydrocortisone cream over the counter for symptoms.  3.  Follow-up in 1 week if any persistent symptoms.  Come in sooner if worsening symptoms.  
I have personally evaluated and examined the patient. The Attending was available to me as a supervising provider if needed.

## 2024-07-30 NOTE — ED ADULT NURSE NOTE - NSICDXPASTMEDICALHX_GEN_ALL_CORE_FT
PAST MEDICAL HISTORY:  Asthma     Cerebral palsy     Gall stones     Hypoglycemia     Immunoglobulin A deficiency     Immunoglobulin G deficiency     Small fiber neuropathy

## 2024-07-30 NOTE — ED ADULT TRIAGE NOTE - CHIEF COMPLAINT QUOTE
female walk in to ED for eval of infected open wound to left medial upper thigh x 4 days. patient states she has been seen at UC; wound packed and oral antibiotics prescribed. patient referred here by UC after follow up for additional work up.

## 2024-07-30 NOTE — ED PROVIDER NOTE - PHYSICAL EXAMINATION
Physical Exam    Vital Signs: I have reviewed the initial vital signs.  Constitutional: well-appearing, appears stated age  Cardiovascular: regular rate, regular rhythm, well-perfused extremities, DP pulse +2 and equal b/l, cap refill <2 sec b/l  Musculoskeletal: supple neck, +L inner thigh erythema 3 cm area with isolated area of induration with abscess draining with packing in place, mild ttp, no crepitus  Integumentary: warm, dry, no rash  Neurologic: LE extremities’ motor and sensory functions grossly intact b/l

## 2024-07-30 NOTE — ED PROVIDER NOTE - NS ED ROS FT
Review of Systems    Constitutional: (-) fever  Cardiovascular: (-) chest pain, (-) palpitation   Respiratory: (-) cough, (-) shortness of breath  Gastrointestinal: (-) abdominal pain (-) vomiting, (-) diarrhea  Musculoskeletal: (-) neck pain, (-) back pain, (-) joint pain  Integumentary: (-) edema  Neurological: (-) weakness, (-) numbness

## 2024-08-02 ENCOUNTER — INPATIENT (INPATIENT)
Facility: HOSPITAL | Age: 39
LOS: 1 days | Discharge: HOME | DRG: 603 | End: 2024-08-04
Attending: STUDENT IN AN ORGANIZED HEALTH CARE EDUCATION/TRAINING PROGRAM | Admitting: STUDENT IN AN ORGANIZED HEALTH CARE EDUCATION/TRAINING PROGRAM
Payer: MEDICARE

## 2024-08-02 VITALS
HEART RATE: 109 BPM | WEIGHT: 160.06 LBS | OXYGEN SATURATION: 99 % | TEMPERATURE: 98 F | RESPIRATION RATE: 18 BRPM | HEIGHT: 66 IN | DIASTOLIC BLOOD PRESSURE: 78 MMHG | SYSTOLIC BLOOD PRESSURE: 137 MMHG

## 2024-08-02 DIAGNOSIS — D84.89 OTHER IMMUNODEFICIENCIES: ICD-10-CM

## 2024-08-02 DIAGNOSIS — E87.6 HYPOKALEMIA: ICD-10-CM

## 2024-08-02 DIAGNOSIS — G43.909 MIGRAINE, UNSPECIFIED, NOT INTRACTABLE, WITHOUT STATUS MIGRAINOSUS: ICD-10-CM

## 2024-08-02 DIAGNOSIS — L03.119 CELLULITIS OF UNSPECIFIED PART OF LIMB: ICD-10-CM

## 2024-08-02 DIAGNOSIS — J45.909 UNSPECIFIED ASTHMA, UNCOMPLICATED: ICD-10-CM

## 2024-08-02 DIAGNOSIS — Z29.9 ENCOUNTER FOR PROPHYLACTIC MEASURES, UNSPECIFIED: ICD-10-CM

## 2024-08-02 LAB
ALBUMIN SERPL ELPH-MCNC: 4.2 G/DL — SIGNIFICANT CHANGE UP (ref 3.4–5)
ALP SERPL-CCNC: 93 U/L — SIGNIFICANT CHANGE UP (ref 40–120)
ALT FLD-CCNC: 10 U/L — LOW (ref 12–42)
ANION GAP SERPL CALC-SCNC: 8 MMOL/L — LOW (ref 9–16)
ANION GAP SERPL CALC-SCNC: 9 MMOL/L — SIGNIFICANT CHANGE UP (ref 5–17)
AST SERPL-CCNC: 11 U/L — LOW (ref 15–37)
BILIRUB SERPL-MCNC: 0.5 MG/DL — SIGNIFICANT CHANGE UP (ref 0.2–1.2)
BUN SERPL-MCNC: 10 MG/DL — SIGNIFICANT CHANGE UP (ref 7–23)
BUN SERPL-MCNC: 9 MG/DL — SIGNIFICANT CHANGE UP (ref 7–23)
CALCIUM SERPL-MCNC: 9.1 MG/DL — SIGNIFICANT CHANGE UP (ref 8.4–10.5)
CALCIUM SERPL-MCNC: 9.2 MG/DL — SIGNIFICANT CHANGE UP (ref 8.5–10.5)
CHLORIDE SERPL-SCNC: 101 MMOL/L — SIGNIFICANT CHANGE UP (ref 96–108)
CHLORIDE SERPL-SCNC: 102 MMOL/L — SIGNIFICANT CHANGE UP (ref 96–108)
CO2 SERPL-SCNC: 24 MMOL/L — SIGNIFICANT CHANGE UP (ref 22–31)
CO2 SERPL-SCNC: 27 MMOL/L — SIGNIFICANT CHANGE UP (ref 22–31)
CREAT SERPL-MCNC: 0.91 MG/DL — SIGNIFICANT CHANGE UP (ref 0.5–1.3)
CREAT SERPL-MCNC: 0.96 MG/DL — SIGNIFICANT CHANGE UP (ref 0.5–1.3)
EGFR: 77 ML/MIN/1.73M2 — SIGNIFICANT CHANGE UP
EGFR: 82 ML/MIN/1.73M2 — SIGNIFICANT CHANGE UP
GLUCOSE SERPL-MCNC: 88 MG/DL — SIGNIFICANT CHANGE UP (ref 70–99)
GLUCOSE SERPL-MCNC: 96 MG/DL — SIGNIFICANT CHANGE UP (ref 70–99)
HCG SERPL-ACNC: <1 MIU/ML — SIGNIFICANT CHANGE UP
HCT VFR BLD CALC: 37.8 % — SIGNIFICANT CHANGE UP (ref 34.5–45)
HGB BLD-MCNC: 13.1 G/DL — SIGNIFICANT CHANGE UP (ref 11.5–15.5)
LACTATE BLDV-MCNC: 2 MMOL/L — SIGNIFICANT CHANGE UP (ref 0.5–2)
MCHC RBC-ENTMCNC: 31.6 PG — SIGNIFICANT CHANGE UP (ref 27–34)
MCHC RBC-ENTMCNC: 34.7 GM/DL — SIGNIFICANT CHANGE UP (ref 32–36)
MCV RBC AUTO: 91.3 FL — SIGNIFICANT CHANGE UP (ref 80–100)
NRBC # BLD: 0 /100 WBCS — SIGNIFICANT CHANGE UP (ref 0–0)
PLATELET # BLD AUTO: 200 K/UL — SIGNIFICANT CHANGE UP (ref 150–400)
POTASSIUM SERPL-MCNC: 3.2 MMOL/L — LOW (ref 3.5–5.3)
POTASSIUM SERPL-MCNC: 3.6 MMOL/L — SIGNIFICANT CHANGE UP (ref 3.5–5.3)
POTASSIUM SERPL-SCNC: 3.2 MMOL/L — LOW (ref 3.5–5.3)
POTASSIUM SERPL-SCNC: 3.6 MMOL/L — SIGNIFICANT CHANGE UP (ref 3.5–5.3)
PROT SERPL-MCNC: 8.1 G/DL — SIGNIFICANT CHANGE UP (ref 6.4–8.2)
RBC # BLD: 4.14 M/UL — SIGNIFICANT CHANGE UP (ref 3.8–5.2)
RBC # FLD: 12.2 % — SIGNIFICANT CHANGE UP (ref 10.3–14.5)
SODIUM SERPL-SCNC: 134 MMOL/L — LOW (ref 135–145)
SODIUM SERPL-SCNC: 137 MMOL/L — SIGNIFICANT CHANGE UP (ref 132–145)
WBC # BLD: 6.52 K/UL — SIGNIFICANT CHANGE UP (ref 3.8–10.5)
WBC # FLD AUTO: 6.52 K/UL — SIGNIFICANT CHANGE UP (ref 3.8–10.5)

## 2024-08-02 PROCEDURE — 99285 EMERGENCY DEPT VISIT HI MDM: CPT | Mod: FS

## 2024-08-02 PROCEDURE — 99222 1ST HOSP IP/OBS MODERATE 55: CPT | Mod: GC

## 2024-08-02 RX ORDER — POTASSIUM CHLORIDE 1500 MG/1
40 TABLET, EXTENDED RELEASE ORAL
Refills: 0 | Status: DISCONTINUED | OUTPATIENT
Start: 2024-08-02 | End: 2024-08-02

## 2024-08-02 RX ORDER — NAPROXEN 250 MG
10 TABLET ORAL
Refills: 0 | DISCHARGE

## 2024-08-02 RX ORDER — POTASSIUM CHLORIDE 1500 MG/1
20 TABLET, EXTENDED RELEASE ORAL ONCE
Refills: 0 | Status: COMPLETED | OUTPATIENT
Start: 2024-08-02 | End: 2024-08-02

## 2024-08-02 RX ORDER — VANCOMYCIN HYDROCHLORIDE 5 G/100ML
1000 INJECTION, POWDER, LYOPHILIZED, FOR SOLUTION INTRAVENOUS ONCE
Refills: 0 | Status: COMPLETED | OUTPATIENT
Start: 2024-08-02 | End: 2024-08-02

## 2024-08-02 RX ORDER — ENOXAPARIN SODIUM 120 MG/.8ML
40 INJECTION SUBCUTANEOUS EVERY 24 HOURS
Refills: 0 | Status: DISCONTINUED | OUTPATIENT
Start: 2024-08-03 | End: 2024-08-04

## 2024-08-02 RX ORDER — POTASSIUM CHLORIDE 1500 MG/1
20 TABLET, EXTENDED RELEASE ORAL ONCE
Refills: 0 | Status: DISCONTINUED | OUTPATIENT
Start: 2024-08-02 | End: 2024-08-02

## 2024-08-02 RX ORDER — ALBUTEROL 90 MCG
2 AEROSOL REFILL (GRAM) INHALATION EVERY 12 HOURS
Refills: 0 | Status: DISCONTINUED | OUTPATIENT
Start: 2024-08-02 | End: 2024-08-04

## 2024-08-02 RX ORDER — VANCOMYCIN HYDROCHLORIDE 5 G/100ML
1000 INJECTION, POWDER, LYOPHILIZED, FOR SOLUTION INTRAVENOUS EVERY 12 HOURS
Refills: 0 | Status: DISCONTINUED | OUTPATIENT
Start: 2024-08-03 | End: 2024-08-03

## 2024-08-02 RX ORDER — GABAPENTIN 400 MG/1
250 CAPSULE ORAL EVERY 8 HOURS
Refills: 0 | Status: DISCONTINUED | OUTPATIENT
Start: 2024-08-02 | End: 2024-08-04

## 2024-08-02 RX ORDER — ACETAMINOPHEN 500 MG
650 TABLET ORAL EVERY 6 HOURS
Refills: 0 | Status: DISCONTINUED | OUTPATIENT
Start: 2024-08-02 | End: 2024-08-04

## 2024-08-02 RX ORDER — RIMEGEPANT SULFATE 75 MG/75MG
1 TABLET, ORALLY DISINTEGRATING ORAL
Refills: 0 | DISCHARGE

## 2024-08-02 RX ADMIN — VANCOMYCIN HYDROCHLORIDE 250 MILLIGRAM(S): 5 INJECTION, POWDER, LYOPHILIZED, FOR SOLUTION INTRAVENOUS at 19:03

## 2024-08-02 RX ADMIN — POTASSIUM CHLORIDE 40 MILLIEQUIVALENT(S): 1500 TABLET, EXTENDED RELEASE ORAL at 22:38

## 2024-08-02 NOTE — H&P ADULT - NSICDXPASTMEDICALHX_GEN_ALL_CORE_FT
PAST MEDICAL HISTORY:  Asthma     Cerebral palsy     Gall stones     Immunoglobulin A deficiency     Immunoglobulin G deficiency     Small fiber neuropathy

## 2024-08-02 NOTE — ED PROVIDER NOTE - OBJECTIVE STATEMENT
39-year-old female with PMH of primary immunodeficiency and asthma presents to the ED complaining of worsening rash to the left leg x 2 days.  Patient reports that she had a left thigh abscess s/p I&D at [facility] on 7/29/2024 and was discharged home on Bactrim.  Patient then went to the ED the next day due to concern for worsening rash and was also given Keflex.  Patient reports that she started getting an abdominal rash after she started taking the Keflex, so she discontinued that.  However, patient is still been taking the Bactrim for 5 days so she is FCI through the antibiotic course.  However, the patient's rash is worsening and she says that it is moving up to her left groin.  Patient says this is the first time this has happened she was concerned and decided to come to the ED.

## 2024-08-02 NOTE — ED PROVIDER NOTE - NS ED ROS FT
Patient denies fever, trauma, chest pain, shortness of breath, abdominal pain, nausea, vomiting, diarrhea, constipation, dysuria, pus from abscess site, alcohol/smoking/drug use.

## 2024-08-02 NOTE — H&P ADULT - PROBLEM SELECTOR PLAN 5
PMH migraines, currently controlled/no headache on ROS. Home med: Nurtec Q48H 75 mg ODT, gabapentin 250 mg/ 5ml TID standing,  Naproxen 125 mg/5 ml Q6H PRN, Rizatriptan 10 mg QD PRN     -Continue Nurtec 75 mg ODT Q48H   -Continue gabapentin 250 mg/5 ml TID

## 2024-08-02 NOTE — ED PROVIDER NOTE - PHYSICAL EXAMINATION
VITAL SIGNS: I have reviewed nursing notes and confirm.  CONSTITUTIONAL: Well-developed; well-nourished; in no acute distress.  SKIN: Erythema around the left thigh wound that is spreading up to the left inguinal crease past the demarcation line. The inguinal crease erythema is not blanchable. Old abscess site is hard  with no fluctuance and no drainage. Skin is warm and dry,   ABD: Soft; non-distended; non-tender; no rebound or guarding.  EXT: Moving all extremities  PSYCH: Cooperative, appropriate. Mood and affect wnl.

## 2024-08-02 NOTE — H&P ADULT - ATTENDING COMMENTS
#Cellulitis: hx purulent cellulitis/abscess s/p I&D 5 d ago w/ packing- now removed, p/w worsening cellulitis despite PO bactrim. 0/4 SIRS, HDS. F/up escr//crp, LLE xray, US to r/o abscess/ c/w vancomycin. fup blood cx, obtain collateral regarding abscess cx from [facility]. c/w serial exams.

## 2024-08-02 NOTE — ED ADULT NURSE NOTE - NSFALLCONCLUSION_ED_ALL_ED
Left message on her voicemail regarding these condition.  Celiac, lipid, glucose results recently were unremarkable.  As far as CPT code, I asked her to forward the family history of blood clot disorder information to me  
Universal Safety Interventions

## 2024-08-02 NOTE — H&P ADULT - PROBLEM SELECTOR PLAN 6
Plan:  F: none  E: replete K<4, Mg<2  N: regular  VTE Prophylaxis: lovenox  GI: None  C: Full Code  D: [facility]

## 2024-08-02 NOTE — ED ADULT NURSE NOTE - OBJECTIVE STATEMENT
left upper discomfort, seen here tuesday for eval of wound, states that redness has spread, no s/s of distress, awaiting provider eval, will continue to monitor

## 2024-08-02 NOTE — ED ADULT TRIAGE NOTE - CHIEF COMPLAINT QUOTE
cut and rash on left leg, on po abs for 5days , inner thigh aspect, over black line that was best 4 days ago

## 2024-08-02 NOTE — H&P ADULT - ASSESSMENT
39F PMH primary immunodeficiency (monthly IVIG infusions), asthma, cerebral palsy,  presents with worsening LLE wound after failed outpatient antibiotic course, admitted for IV antibitiocs for LLE cellultis  39F (transgender M->F) PMH primary immunodeficiency (monthly IVIG infusions), asthma, cerebral palsy,  presents with worsening LLE wound after failed outpatient antibiotic course, admitted for IV antibitiocs for LLE cellultis  39F (transgender M->F) PMH primary immunodeficiency (monthly IVIG infusions), asthma, cerebral palsy w spastic diplegia,  presents with worsening LLE cellultis after failed PO antibiotic course, admitted for LLE cellulitis

## 2024-08-02 NOTE — H&P ADULT - NSHPLABSRESULTS_GEN_ALL_CORE
LABS:                         13.1   6.52  )-----------( 200      ( 02 Aug 2024 18:24 )             37.8     08-02    137  |  102  |  10  ----------------------------<  88  3.2<L>   |  27  |  0.96    Ca    9.2      02 Aug 2024 18:24    TPro  8.1  /  Alb  4.2  /  TBili  0.5  /  DBili  x   /  AST  11<L>  /  ALT  10<L>  /  AlkPhos  93  08-02      Urinalysis Basic - ( 02 Aug 2024 18:24 )    Color: x / Appearance: x / SG: x / pH: x  Gluc: 88 mg/dL / Ketone: x  / Bili: x / Urobili: x   Blood: x / Protein: x / Nitrite: x   Leuk Esterase: x / RBC: x / WBC x   Sq Epi: x / Non Sq Epi: x / Bacteria: x                RADIOLOGY, EKG & ADDITIONAL TESTS:

## 2024-08-02 NOTE — H&P ADULT - PROBLEM SELECTOR PLAN 3
Hypokalemia likely due to multiple nutritional deficiencies per patient, has history hypokalemia as documented in HIE from 2019     -Replete PRN   -BMP in AM

## 2024-08-02 NOTE — H&P ADULT - PROBLEM SELECTOR PLAN 1
LLE thigh wound s/p I&D with purulence at the time; now with spreading erythema to inguinal crease, failed outpatient abx (previously on Keflex, discontinued prematurely 2/2 abdominal rash, s/p 5d bactrim course); not meeting SIRS (no WBC, afebrile, HDS, well-appearing), however warrants MRSA coverage given failed PO course immunocompromised status, and previously noted purulence. exam noted to be with focal wound s/p I&D w/o purulence, fluctuance, or crepitus, surrounding erythema that is now receding.     -Monitor demarcation for worsening erythema   -Continue vancomycin. Collect trough prior to 4th dose   -F/u bcx LLE thigh wound s/p I&D with purulence at the time; now with spreading erythema to inguinal crease, failed outpatient abx (previously on Keflex, discontinued prematurely 2/2 abdominal rash, s/p 5d bactrim course); not meeting SIRS (no WBC, afebrile, HDS, well-appearing), however warrants MRSA coverage given failed PO course immunocompromised status, and previously noted purulence. exam noted to be with focal wound s/p I&D w/ some firmness surrounding I&D site but w/o purulence, significant fluctuance, or crepitus, surrounding erythema that is now receding.     -US of L. upper thigh to r/o residual abscess   -Monitor demarcation for worsening erythema   -Continue vancomycin. Collect trough prior to 4th dose   -F/u bcx

## 2024-08-02 NOTE — H&P ADULT - PROBLEM SELECTOR PLAN 2
History of IgA and IgG deficiency, follows regularly with allergist/immunologist (Dr. Jelena Chauhan) and [facility] for which she received IVIG monthly infusions (next infusion due this upcoming Mon 8/5/24) Has been tolerated infusions without issue for several years. History of overnight admissions for URI/PNA in the past but has never been diagnosed with SSTI in the past    -Follow up outpatient for continued IVIG infusions

## 2024-08-02 NOTE — H&P ADULT - HISTORY OF PRESENT ILLNESS
39F PMH primary immunodeficiency (on monthly IVIG), asthma, cerebral palsy w/ spastic diplegia, presents with worsening LLE wound for ~5 day duration. Reports prior L. thigh abscess and [facility], prescribed Keflex and sent home. Returned the next day given worsening erythema and was then prescribed Bactrim in addition and sent home again. She reports an abdominal rash developing after Keflex so discontinued it but completed Bactrim for 5 day course. She now presents with spreading erythema to the L. groin that she states has since receded since being in [facility] and receiving IV antibiotics.     Denies this previously occurring. Denies fever/chills, SOB, N/V/D. Denies trauma or known bug bites to the area. Does not use etoh or illicit substances. She is very medically literate and follows consistently with her medical providers (including neurology, allergy, and PCP). Of note, she has trouble swallowing pills at baseline 2/2 cerebral palsy, has to take most of her medications in solution form, and takes a nutritional formula given her vast food allergies.     ED:   VSS: T 98.1, HR 79, /84, RR 16, O2 97% on RA   Labs: WBC 6, H/H: 13/37, Plts 200, K 3.2, HCG negative   Imaging: None   Interventions: Vancomycin 1000 mg x1      39F (transgender M->F) PMH primary immunodeficiency (on monthly IVIG), asthma, cerebral palsy w/ spastic diplegia, presents with worsening LLE wound for ~5 day duration. Reports prior L. thigh abscess and [facility], prescribed Keflex and sent home. Returned the next day given worsening erythema and was then prescribed Bactrim in addition and sent home again. She reports an abdominal rash developing after Keflex so discontinued it but completed Bactrim for 5 day course. She now presents with spreading erythema to the L. groin that she states has since receded since being in [facility] and receiving IV antibiotics.     Denies this previously occurring. Denies fever/chills, SOB, N/V/D, pain. Denies trauma or known bug bites to the area. Does not use etoh or illicit substances. She is very medically literate and follows consistently with her medical providers (including neurology, allergy, and PCP). Of note, she has trouble swallowing pills at baseline 2/2 cerebral palsy, has to take most of her medications in solution form, and takes a nutritional formula given her vast food allergies.     ED:   VSS: T 98.1, HR 79, /84, RR 16, O2 97% on RA   Labs: WBC 6, H/H: 13/37, Plts 200, K 3.2, HCG negative   Imaging: None   Interventions: Vancomycin 1000 mg x1

## 2024-08-02 NOTE — H&P ADULT - NSHPPHYSICALEXAM_GEN_ALL_CORE
PHYSICAL EXAM:  Constitutional: NAD, comfortable in bed.  HEENT: NC/AT, PERRLA, MMM  Neck: Supple, no JVD  Respiratory: CTA B/L. No w/r/r.   Cardiovascular: RRR, normal S1 and S2, no m/r/g.   Gastrointestinal: +BS, soft NTND, no guarding or rebound tenderness  Extremities: wwp; no cyanosis, clubbing or edema. See "skin" for details  Neurological: AAOx3, no CN deficits, strength and sensation intact throughout.   Skin: 1 cm erythematous wound of L. inner thigh (s/p I&D), no notable bleeding or purulence, surrounding by nontender and non-blanching erythema to area of inguinal crease, receding from area of demarcation noted. no crepitus or fluctuance noted PHYSICAL EXAM:  Constitutional: NAD, comfortable in bed.  HEENT: NC/AT, PERRLA, MMM  Neck: Supple, no JVD  Respiratory: CTA B/L. No w/r/r.   Cardiovascular: RRR, normal S1 and S2, no m/r/g.   Gastrointestinal: +BS, soft NTND, no guarding or rebound tenderness  Extremities: wwp; no cyanosis, clubbing or edema. See "skin" for details  Neurological: AAOx3, no CN deficits, strength and sensation intact throughout.   Skin: 1 cm erythematous wound of L. inner thigh (s/p I&D) w central tenderness/firmness, no notable bleeding or purulence, surrounding by nontender and non-blanching erythema to area of inguinal crease, receding from area of demarcation noted. no crepitus or fluctuance noted

## 2024-08-02 NOTE — ED PROVIDER NOTE - CLINICAL SUMMARY MEDICAL DECISION MAKING FREE TEXT BOX
39-year-old female with PMH of primary immunodeficiency and asthma presents to the ED complaining of worsening rash to the left leg x 2 days.  PE significant for erythema around the left thigh wound that is spreading up to the left inguinal crease past the demarcation line. In the ED, patient ordered for Vancomycin. Patient will be admitted for cellulitis that is worsening and failed outpatient antibiotics.

## 2024-08-03 ENCOUNTER — TRANSCRIPTION ENCOUNTER (OUTPATIENT)
Age: 39
End: 2024-08-03

## 2024-08-03 LAB
ANION GAP SERPL CALC-SCNC: 7 MMOL/L — SIGNIFICANT CHANGE UP (ref 5–17)
BASOPHILS # BLD AUTO: 0.01 K/UL — SIGNIFICANT CHANGE UP (ref 0–0.2)
BASOPHILS NFR BLD AUTO: 0.2 % — SIGNIFICANT CHANGE UP (ref 0–2)
BUN SERPL-MCNC: 8 MG/DL — SIGNIFICANT CHANGE UP (ref 7–23)
CALCIUM SERPL-MCNC: 9.4 MG/DL — SIGNIFICANT CHANGE UP (ref 8.4–10.5)
CHLORIDE SERPL-SCNC: 103 MMOL/L — SIGNIFICANT CHANGE UP (ref 96–108)
CO2 SERPL-SCNC: 23 MMOL/L — SIGNIFICANT CHANGE UP (ref 22–31)
CREAT SERPL-MCNC: 0.85 MG/DL — SIGNIFICANT CHANGE UP (ref 0.5–1.3)
EGFR: 89 ML/MIN/1.73M2 — SIGNIFICANT CHANGE UP
EOSINOPHIL # BLD AUTO: 0.04 K/UL — SIGNIFICANT CHANGE UP (ref 0–0.5)
EOSINOPHIL NFR BLD AUTO: 0.7 % — SIGNIFICANT CHANGE UP (ref 0–6)
GLUCOSE SERPL-MCNC: 90 MG/DL — SIGNIFICANT CHANGE UP (ref 70–99)
HCT VFR BLD CALC: 34.9 % — SIGNIFICANT CHANGE UP (ref 34.5–45)
HGB BLD-MCNC: 11.9 G/DL — SIGNIFICANT CHANGE UP (ref 11.5–15.5)
IMM GRANULOCYTES NFR BLD AUTO: 0.2 % — SIGNIFICANT CHANGE UP (ref 0–0.9)
LYMPHOCYTES # BLD AUTO: 1.54 K/UL — SIGNIFICANT CHANGE UP (ref 1–3.3)
LYMPHOCYTES # BLD AUTO: 27.9 % — SIGNIFICANT CHANGE UP (ref 13–44)
MAGNESIUM SERPL-MCNC: 2.3 MG/DL — SIGNIFICANT CHANGE UP (ref 1.6–2.6)
MCHC RBC-ENTMCNC: 30.7 PG — SIGNIFICANT CHANGE UP (ref 27–34)
MCHC RBC-ENTMCNC: 34.1 GM/DL — SIGNIFICANT CHANGE UP (ref 32–36)
MCV RBC AUTO: 90.2 FL — SIGNIFICANT CHANGE UP (ref 80–100)
MONOCYTES # BLD AUTO: 0.67 K/UL — SIGNIFICANT CHANGE UP (ref 0–0.9)
MONOCYTES NFR BLD AUTO: 12.1 % — SIGNIFICANT CHANGE UP (ref 2–14)
NEUTROPHILS # BLD AUTO: 3.25 K/UL — SIGNIFICANT CHANGE UP (ref 1.8–7.4)
NEUTROPHILS NFR BLD AUTO: 58.9 % — SIGNIFICANT CHANGE UP (ref 43–77)
NRBC # BLD: 0 /100 WBCS — SIGNIFICANT CHANGE UP (ref 0–0)
PHOSPHATE SERPL-MCNC: 3 MG/DL — SIGNIFICANT CHANGE UP (ref 2.5–4.5)
PLATELET # BLD AUTO: 187 K/UL — SIGNIFICANT CHANGE UP (ref 150–400)
POTASSIUM SERPL-MCNC: 3.8 MMOL/L — SIGNIFICANT CHANGE UP (ref 3.5–5.3)
POTASSIUM SERPL-SCNC: 3.8 MMOL/L — SIGNIFICANT CHANGE UP (ref 3.5–5.3)
RBC # BLD: 3.87 M/UL — SIGNIFICANT CHANGE UP (ref 3.8–5.2)
RBC # FLD: 12.1 % — SIGNIFICANT CHANGE UP (ref 10.3–14.5)
SODIUM SERPL-SCNC: 133 MMOL/L — LOW (ref 135–145)
WBC # BLD: 5.52 K/UL — SIGNIFICANT CHANGE UP (ref 3.8–10.5)
WBC # FLD AUTO: 5.52 K/UL — SIGNIFICANT CHANGE UP (ref 3.8–10.5)

## 2024-08-03 PROCEDURE — 99233 SBSQ HOSP IP/OBS HIGH 50: CPT | Mod: GC

## 2024-08-03 PROCEDURE — 76882 US LMTD JT/FCL EVL NVASC XTR: CPT | Mod: 26,LT

## 2024-08-03 RX ORDER — LINEZOLID 600 MG/300ML
30 INJECTION, SOLUTION INTRAVENOUS
Qty: 4 | Refills: 0
Start: 2024-08-03 | End: 2024-08-11

## 2024-08-03 RX ORDER — POTASSIUM CHLORIDE 1500 MG/1
20 TABLET, EXTENDED RELEASE ORAL ONCE
Refills: 0 | Status: COMPLETED | OUTPATIENT
Start: 2024-08-03 | End: 2024-08-03

## 2024-08-03 RX ORDER — VANCOMYCIN HYDROCHLORIDE 5 G/100ML
1000 INJECTION, POWDER, LYOPHILIZED, FOR SOLUTION INTRAVENOUS EVERY 12 HOURS
Refills: 0 | Status: COMPLETED | OUTPATIENT
Start: 2024-08-03 | End: 2024-08-03

## 2024-08-03 RX ADMIN — GABAPENTIN 250 MILLIGRAM(S): 400 CAPSULE ORAL at 14:57

## 2024-08-03 RX ADMIN — VANCOMYCIN HYDROCHLORIDE 250 MILLIGRAM(S): 5 INJECTION, POWDER, LYOPHILIZED, FOR SOLUTION INTRAVENOUS at 19:04

## 2024-08-03 RX ADMIN — POTASSIUM CHLORIDE 20 MILLIEQUIVALENT(S): 1500 TABLET, EXTENDED RELEASE ORAL at 11:00

## 2024-08-03 RX ADMIN — GABAPENTIN 250 MILLIGRAM(S): 400 CAPSULE ORAL at 06:54

## 2024-08-03 RX ADMIN — VANCOMYCIN HYDROCHLORIDE 250 MILLIGRAM(S): 5 INJECTION, POWDER, LYOPHILIZED, FOR SOLUTION INTRAVENOUS at 07:01

## 2024-08-03 RX ADMIN — GABAPENTIN 250 MILLIGRAM(S): 400 CAPSULE ORAL at 23:19

## 2024-08-03 NOTE — DISCHARGE NOTE PROVIDER - NSDCCPCAREPLAN_GEN_ALL_CORE_FT
PRINCIPAL DISCHARGE DIAGNOSIS  Diagnosis: Cellulitis  Assessment and Plan of Treatment: You were found to have a soft tissue infection. We performed an ultrasound of your leg which showed you had a small fluid collection. You were treated with IV antibiotics. Your infection improved. Please continue to take your oral antibiotics     PRINCIPAL DISCHARGE DIAGNOSIS  Diagnosis: Cellulitis  Assessment and Plan of Treatment: You were found to have a soft tissue infection. We performed an ultrasound of your leg which showed you had a small fluid collection, while you were admitted we were able to drain it and start you on IV antibiotics. I am happy to say that you have responded to the medciation and your left thigh has improved, afterwards we repeated the ultrasound that showed a small abcess that was imprving in the right direction. To ensure that you have proper treatment, we will be sending you on a 10 day course antibiotics.  PLEASE CONTINUE THE FOLLOWING  linezolid 600 mg oral every 12 hrs for a total of 10 days  Please return if the following happens  your left leg redness does not improve assoicated with high fevers  left leg site abscess returns assoicated wiht fevers  Thank you so much for coming to [facility]

## 2024-08-03 NOTE — PROGRESS NOTE ADULT - SUBJECTIVE AND OBJECTIVE BOX
Patient is a 39y old  Female who presents with a chief complaint of LLE cellulitis (02 Aug 2024 21:40)      INTERVAL HPI/OVERNIGHT EVENTS: offers no new complaints; current symptoms resolving    MEDICATIONS  (STANDING):  enoxaparin Injectable 40 milliGRAM(s) SubCutaneous every 24 hours  gabapentin Solution 250 milliGRAM(s) Oral every 8 hours  Rimegepant (Nurtec) 75mg ODT 1 Tablet(s) 1 Tablet(s) SubLingual every 48 hours  vancomycin  IVPB 1000 milliGRAM(s) IV Intermittent every 12 hours    MEDICATIONS  (PRN):  acetaminophen     Tablet .. 650 milliGRAM(s) Oral every 6 hours PRN Temp greater or equal to 38C (100.4F), Mild Pain (1 - 3)  albuterol    90 MICROgram(s) HFA Inhaler 2 Puff(s) Inhalation every 12 hours PRN Shortness of Breath and/or Wheezing      __________________________________________________  REVIEW OF SYSTEMS:    CONSTITUTIONAL: No fever,   EYES: no acute visual disturbances  NECK: No pain or stiffness  RESPIRATORY: No cough; No shortness of breath  CARDIOVASCULAR: No chest pain, no palpitations  GASTROINTESTINAL: No pain. No nausea or vomiting; No diarrhea   NEUROLOGICAL: No headache or numbness, no tremors  MUSCULOSKELETAL: No joint pain, no muscle pain  GENITOURINARY: no dysuria, no frequency, no hesitancy  PSYCHIATRY: no depression , no anxiety  ALL OTHER  ROS negative        Vital Signs Last 24 Hrs  T(C): 36.4 (03 Aug 2024 06:18), Max: 36.8 (02 Aug 2024 21:45)  T(F): 97.6 (03 Aug 2024 06:18), Max: 98.2 (02 Aug 2024 21:45)  HR: 80 (03 Aug 2024 06:18) (74 - 109)  BP: 121/87 (03 Aug 2024 06:18) (121/87 - 153/84)  BP(mean): --  RR: 18 (03 Aug 2024 06:18) (16 - 18)  SpO2: 98% (03 Aug 2024 06:18) (97% - 99%)    Parameters below as of 03 Aug 2024 06:18  Patient On (Oxygen Delivery Method): room air        ________________________________________________  PHYSICAL EXAM:  GENERAL: NAD  HEENT: Normocephalic;  conjunctivae and sclerae clear; moist mucous membranes;   NECK : supple  CHEST/LUNG: Clear to auscultation bilaterally with good air entry   HEART: S1 S2  regular; no murmurs, gallops or rubs  ABDOMEN: Soft, Nontender, Nondistended; Bowel sounds present  EXTREMITIES: no cyanosis; no edema; no calf tenderness  SKIN: Left thigh wound indurated w.o fluctuance erythema regressing and within demarcated lines  NERVOUS SYSTEM:  Awake and alert; Oriented  to place, person and time ; no new deficits    _________________________________________________  LABS:                        11.9   5.52  )-----------( 187      ( 03 Aug 2024 06:37 )             34.9     08-03    133<L>  |  103  |  8   ----------------------------<  90  3.8   |  23  |  0.85    Ca    9.4      03 Aug 2024 06:37  Phos  3.0     08-03  Mg     2.3     08-03    TPro  8.1  /  Alb  4.2  /  TBili  0.5  /  DBili  x   /  AST  11<L>  /  ALT  10<L>  /  AlkPhos  93  08-02      Urinalysis Basic - ( 03 Aug 2024 06:37 )    Color: x / Appearance: x / SG: x / pH: x  Gluc: 90 mg/dL / Ketone: x  / Bili: x / Urobili: x   Blood: x / Protein: x / Nitrite: x   Leuk Esterase: x / RBC: x / WBC x   Sq Epi: x / Non Sq Epi: x / Bacteria: x      CAPILLARY BLOOD GLUCOSE            RADIOLOGY & ADDITIONAL TESTS:      Plan of care was discussed with patient and /or primary care giver; all questions and concerns were addressed and care was aligned with patient's wishes.

## 2024-08-03 NOTE — PROGRESS NOTE ADULT - ASSESSMENT
39F (transgender M->F) PMH primary immunodeficiency (monthly IVIG infusions), asthma, cerebral palsy w spastic diplegia,  presents with worsening LLE cellulitis after failed PO antibiotic course, admitted for LLE cellulitis

## 2024-08-03 NOTE — PROGRESS NOTE ADULT - PROBLEM SELECTOR PLAN 3
Hypokalemia likely due to multiple nutritional deficiencies per patient, has history hypokalemia as documented in HIE from 2019     -Repleted and improved

## 2024-08-03 NOTE — PROGRESS NOTE ADULT - PROBLEM SELECTOR PLAN 2
History of IgA and IgG deficiency, follows regularly with allergist/immunologist (Dr. Jelena Chauhan) and [facility] for which she received IVIG monthly infusions (next infusion due this upcoming Mon 8/5/24) Has been tolerated infusions without issue for several years. History of overnight admissions for URI/PNA in the past but has never been diagnosed with SSTI in the past    -Follow up outpatient for continued IVIG infusions

## 2024-08-03 NOTE — DISCHARGE NOTE PROVIDER - ATTENDING DISCHARGE PHYSICAL EXAMINATION:
Physical exam:  GENERAL: NAD, lying in bed comfortably  HEAD:  Atraumatic, Normocephalic  EYES: EOMI, PERRLA, conjunctiva and sclera clear  ENT: Moist mucous membranes  NECK: Supple, No JVD  CHEST/LUNG: Clear to auscultation bilaterally; No rales, rhonchi, wheezing, or rubs.  HEART: Regular rate and rhythm; S1+ S2+   ABDOMEN: Bowel sounds present; Soft, Nontender, Nondistended   EXTREMITIES:  2+ Peripheral Pulses, brisk capillary refill. No clubbing, cyanosis, or edema  NERVOUS SYSTEM:  Alert & Oriented , speech clear   MSK: FROM all 4 extremities, full and equal strength  SKIN: Left thigh wound indurated w.o fluctuance erythema regressing and within demarcated lines    Cellulitis of left thigh w abscess : hx purulent cellulitis/abscess s/p I&D 5 d ago w/ packing- now removed, p/w worsening cellulitis despite PO bactrim. 0/4 SIRS, HDS.  NGTD on blood cx, no cx obtained from [facility].  w < 1 cm abscess   will dc with suspension linezolid 600 mg bid for 10 days

## 2024-08-03 NOTE — PROGRESS NOTE ADULT - ATTENDING COMMENTS
#Cellulitis: hx purulent cellulitis/abscess s/p I&D 5 d ago w/ packing- now removed, p/w worsening cellulitis despite PO bactrim. 0/4 SIRS, HDS.   F/up US to r/o abscess/ c/w vancomycin. fup blood cx, no cx obtained from [facility]. c/w serial exams.  likely dc in 24 hours

## 2024-08-03 NOTE — PROGRESS NOTE ADULT - PROBLEM SELECTOR PLAN 6
Plan:  F: none  E: replete K<4, Mg<2  N: regular  VTE Prophylaxis: lovenox  GI: None  C: Full Code  D: [facility]

## 2024-08-03 NOTE — DISCHARGE NOTE PROVIDER - NSDCMRMEDTOKEN_GEN_ALL_CORE_FT
gabapentin 250 mg/5 mL oral solution: TID  linezolid 100 mg/5 mL oral liquid: 30 milliliter(s) orally every 12 hours  naproxen 125 mg/5 mL oral suspension: 10 milliliter(s) orally every 6 hours as needed for  moderate pain  Nurtec ODT 75 mg oral tablet, disintegratin tab(s) orally every 48 hours  rizatriptan 10 mg oral tablet, disintegratin tab(s) orally once a day as needed for  migraine   gabapentin 250 mg/5 mL oral solution: orally 3 times a day  linezolid 600 mg oral tablet: 1 tab(s) orally every 12 hours  naproxen 125 mg/5 mL oral suspension: 10 milliliter(s) orally every 6 hours as needed for  moderate pain  Nurtec ODT 75 mg oral tablet, disintegratin tab(s) orally every 48 hours

## 2024-08-03 NOTE — DISCHARGE NOTE PROVIDER - HOSPITAL COURSE
#Discharge: do not delete    Patient is __ yo M/F with past medical history of _____  Presented with _____, found to have _____  Problem List/Main Diagnoses (system-based):   Inpatient treatment course:   Patient was discharged to: home  New medications: linezolid 600 q12 suspension x 10 days  Changes to old medications: none  Medications that were stopped:  Items to Follow up as outpatient: CBC  Physical exam at time of discharge:   PHYSICAL EXAM  General: NAD  Head: NC/AT; MMM; PERRL; EOMI;  Neck: Supple; no JVD  Respiratory: CTAB; no wheezes/rales/rhonchi  Cardiovascular: Regular rhythm/rate; S1/S2+, no murmurs, rubs gallops   Gastrointestinal: Soft; NTND; bowel sounds normal and present  Extremities: WWP; L thigh abscess with induration no tracking/streaking, mildly warm to touch  Neurological: A&Ox3, CNII-XII grossly intact; no obvious focal deficits  Skin: Clean and intact. Good skin turgor. Without open wounds and sores   #Discharge: do not delete    39F (transgender M->F) PMH primary immunodeficiency (monthly IVIG infusions), asthma, cerebral palsy w spastic diplegia,  presents with worsening LLE cellulitis after failed PO antibiotic course, admitted for LLE cellulitis, s/p ID, dc on po abx    1. Cellulitis, leg: LLE thigh wound s/p I&D with purulence at the time; now with spreading erythema to inguinal crease, failed outpatient abx (previously on Keflex, discontinued prematurely 2/2 abdominal rash, s/p 5d bactrim course); not meeting SIRS (no WBC, afebrile, HDS, well-appearing), however warrants MRSA coverage given failed PO course immunocompromised status, and previously noted purulence. exam noted to be with focal wound s/p I&D w/ some firmness surrounding I&D site but w/o purulence, significant fluctuance, or crepitus, surrounding erythema that is now receding, bc ngtd, s/p I&D 5 d ago w/ packing- now removed, L/E U/S showing 0.4 x 0.3 x 0.6cm abscess likely too small to drain s/p vancomycin  plan  - will dc Linezolid capsule 600 mg BID 10 day course    2. Primary immunodeficiency disorder: History of IgA and IgG deficiency, follows regularly with allergist/immunologist (Dr. Jelena Chauhan) and [facility] for which she received IVIG monthly infusions (next infusion due this upcoming Mon 8/5/24) Has been tolerated infusions without issue for several years. History of overnight admissions for URI/PNA in the past but has never been diagnosed with SSTI in the past  plan  -Follow up outpatient for continued IVIG infusions.    3. Hypokalemia: Hypokalemia likely due to multiple nutritional deficiencies per patient, has history hypokalemia as documented in HIE from 2019   resolved     4. Asthma: no respiratory complaints, no wheezing on exam. rarely requires rescue inhaler   -Ventolin PRN.    5.  Migraine: PMH migraines, currently controlled/no headache on ROS. Home med: Nurtec Q48H 75 mg ODT, gabapentin 250 mg/ 5ml TID standing,  Naproxen 125 mg/5 ml Q6H PRN, Rizatriptan 10 mg QD PRN   - c/w home meds        Patient was discharged to: home  New medications: linezolid 600 mg po q12 x 10 days  Changes to old medications: none  Medications that were stopped: none  Items to Follow up as outpatient: CBC      Physical exam at time of discharge:   PHYSICAL EXAM  General: NAD  Head: NC/AT; MMM; PERRL; EOMI;  Neck: Supple; no JVD  Respiratory: CTAB; no wheezes/rales/rhonchi  Cardiovascular: Regular rhythm/rate; S1/S2+, no murmurs, rubs gallops   Gastrointestinal: Soft; NTND; bowel sounds normal and present  Extremities: WWP; L thigh abscess with induration no tracking/streaking, mildly warm to touch  Neurological: A&Ox3, CNII-XII grossly intact; no obvious focal deficits  Skin: Clean and intact. Good skin turgor. Without open wounds and sores   #Discharge: do not delete    39F (transgender M->F) PMH primary immunodeficiency (monthly IVIG infusions), asthma, cerebral palsy w spastic diplegia,  presents with worsening LLE cellulitis after failed PO antibiotic course, admitted for LLE cellulitis, s/p ID, dc on po abx    1. Cellulitis, leg: LLE thigh wound s/p I&D with purulence at the time; now with spreading erythema to inguinal crease, failed outpatient abx (previously on Keflex, discontinued prematurely 2/2 abdominal rash, s/p 5d bactrim course); not meeting SIRS (no WBC, afebrile, HDS, well-appearing), however warrants MRSA coverage given failed PO course immunocompromised status, and previously noted purulence. exam noted to be with focal wound s/p I&D w/ some firmness surrounding I&D site but w/o purulence, significant fluctuance, or crepitus, surrounding erythema that is now receding, bc ngtd, s/p I&D 5 d ago w/ packing- now removed, L/E U/S showing 0.4 x 0.3 x 0.6cm abscess likely too small to drain s/p vancomycin  plan  - will dc with Linezolid capsule 600 mg BID 10 day course    2. Primary immunodeficiency disorder: History of IgA and IgG deficiency, follows regularly with allergist/immunologist (Dr. Jelena Chauhan) and [facility] for which she received IVIG monthly infusions (next infusion due this upcoming Mon 8/5/24) Has been tolerated infusions without issue for several years. History of overnight admissions for URI/PNA in the past but has never been diagnosed with SSTI in the past  plan  -Follow up outpatient for continued IVIG infusions.    3. Hypokalemia: Hypokalemia likely due to multiple nutritional deficiencies per patient, has history hypokalemia as documented in HIE from 2019   resolved     4. Asthma: no respiratory complaints, no wheezing on exam. rarely requires rescue inhaler   -Ventolin PRN.    5.  Migraine: PMH migraines, currently controlled/no headache on ROS. Home med: Nurtec Q48H 75 mg ODT, gabapentin 250 mg/ 5ml TID standing,  Naproxen 125 mg/5 ml Q6H PRN, Rizatriptan 10 mg QD PRN   - c/w home meds        Patient was discharged to: home  New medications: linezolid 600 mg po q12 x 10 days  Changes to old medications: none  Medications that were stopped: none  Items to Follow up as outpatient: CBC      Physical exam at time of discharge:   PHYSICAL EXAM  General: NAD  Head: NC/AT; MMM; PERRL; EOMI;  Neck: Supple; no JVD  Respiratory: CTAB; no wheezes/rales/rhonchi  Cardiovascular: Regular rhythm/rate; S1/S2+, no murmurs, rubs gallops   Gastrointestinal: Soft; NTND; bowel sounds normal and present  Extremities: WWP; L thigh abscess with induration no tracking/streaking, mildly warm to touch  Neurological: A&Ox3, CNII-XII grossly intact; no obvious focal deficits  Skin: Clean and intact. Good skin turgor. Without open wounds and sores

## 2024-08-03 NOTE — PROGRESS NOTE ADULT - PROBLEM SELECTOR PLAN 1
LLE thigh wound s/p I&D with purulence at the time; now with spreading erythema to inguinal crease, failed outpatient abx (previously on Keflex, discontinued prematurely 2/2 abdominal rash, s/p 5d bactrim course); not meeting SIRS (no WBC, afebrile, HDS, well-appearing), however warrants MRSA coverage given failed PO course immunocompromised status, and previously noted purulence. exam noted to be with focal wound s/p I&D w/ some firmness surrounding I&D site but w/o purulence, significant fluctuance, or crepitus, surrounding erythema that is now receding.     -US of L. upper thigh to r/o residual abscess   -Monitor demarcation for worsening erythema   -Continue vancomycin. Collect trough prior to 4th dose   -F/u bcx   -will likely dc on linezolid for total 10 days

## 2024-08-04 VITALS
HEART RATE: 80 BPM | OXYGEN SATURATION: 100 % | TEMPERATURE: 98 F | DIASTOLIC BLOOD PRESSURE: 80 MMHG | RESPIRATION RATE: 16 BRPM | SYSTOLIC BLOOD PRESSURE: 126 MMHG

## 2024-08-04 LAB
ANION GAP SERPL CALC-SCNC: 11 MMOL/L — SIGNIFICANT CHANGE UP (ref 5–17)
BASOPHILS # BLD AUTO: 0.01 K/UL — SIGNIFICANT CHANGE UP (ref 0–0.2)
BASOPHILS NFR BLD AUTO: 0.2 % — SIGNIFICANT CHANGE UP (ref 0–2)
BUN SERPL-MCNC: 8 MG/DL — SIGNIFICANT CHANGE UP (ref 7–23)
CALCIUM SERPL-MCNC: 9.6 MG/DL — SIGNIFICANT CHANGE UP (ref 8.4–10.5)
CHLORIDE SERPL-SCNC: 101 MMOL/L — SIGNIFICANT CHANGE UP (ref 96–108)
CO2 SERPL-SCNC: 24 MMOL/L — SIGNIFICANT CHANGE UP (ref 22–31)
CREAT SERPL-MCNC: 0.79 MG/DL — SIGNIFICANT CHANGE UP (ref 0.5–1.3)
EGFR: 98 ML/MIN/1.73M2 — SIGNIFICANT CHANGE UP
EOSINOPHIL # BLD AUTO: 0.04 K/UL — SIGNIFICANT CHANGE UP (ref 0–0.5)
EOSINOPHIL NFR BLD AUTO: 0.8 % — SIGNIFICANT CHANGE UP (ref 0–6)
GLUCOSE SERPL-MCNC: 74 MG/DL — SIGNIFICANT CHANGE UP (ref 70–99)
HCT VFR BLD CALC: 38 % — SIGNIFICANT CHANGE UP (ref 34.5–45)
HGB BLD-MCNC: 13.1 G/DL — SIGNIFICANT CHANGE UP (ref 11.5–15.5)
IMM GRANULOCYTES NFR BLD AUTO: 0.2 % — SIGNIFICANT CHANGE UP (ref 0–0.9)
LYMPHOCYTES # BLD AUTO: 1.18 K/UL — SIGNIFICANT CHANGE UP (ref 1–3.3)
LYMPHOCYTES # BLD AUTO: 24.5 % — SIGNIFICANT CHANGE UP (ref 13–44)
MAGNESIUM SERPL-MCNC: 2.1 MG/DL — SIGNIFICANT CHANGE UP (ref 1.6–2.6)
MCHC RBC-ENTMCNC: 32 PG — SIGNIFICANT CHANGE UP (ref 27–34)
MCHC RBC-ENTMCNC: 34.5 GM/DL — SIGNIFICANT CHANGE UP (ref 32–36)
MCV RBC AUTO: 92.7 FL — SIGNIFICANT CHANGE UP (ref 80–100)
MONOCYTES # BLD AUTO: 0.51 K/UL — SIGNIFICANT CHANGE UP (ref 0–0.9)
MONOCYTES NFR BLD AUTO: 10.6 % — SIGNIFICANT CHANGE UP (ref 2–14)
NEUTROPHILS # BLD AUTO: 3.06 K/UL — SIGNIFICANT CHANGE UP (ref 1.8–7.4)
NEUTROPHILS NFR BLD AUTO: 63.7 % — SIGNIFICANT CHANGE UP (ref 43–77)
NRBC # BLD: 0 /100 WBCS — SIGNIFICANT CHANGE UP (ref 0–0)
PHOSPHATE SERPL-MCNC: 3 MG/DL — SIGNIFICANT CHANGE UP (ref 2.5–4.5)
PLATELET # BLD AUTO: 185 K/UL — SIGNIFICANT CHANGE UP (ref 150–400)
POTASSIUM SERPL-MCNC: 4 MMOL/L — SIGNIFICANT CHANGE UP (ref 3.5–5.3)
POTASSIUM SERPL-SCNC: 4 MMOL/L — SIGNIFICANT CHANGE UP (ref 3.5–5.3)
RAPID RVP RESULT: SIGNIFICANT CHANGE UP
RBC # BLD: 4.1 M/UL — SIGNIFICANT CHANGE UP (ref 3.8–5.2)
RBC # FLD: 12.4 % — SIGNIFICANT CHANGE UP (ref 10.3–14.5)
SARS-COV-2 RNA SPEC QL NAA+PROBE: SIGNIFICANT CHANGE UP
SODIUM SERPL-SCNC: 136 MMOL/L — SIGNIFICANT CHANGE UP (ref 135–145)
VANCOMYCIN TROUGH SERPL-MCNC: 6.6 UG/ML — LOW (ref 10–20)
WBC # BLD: 4.81 K/UL — SIGNIFICANT CHANGE UP (ref 3.8–10.5)
WBC # FLD AUTO: 4.81 K/UL — SIGNIFICANT CHANGE UP (ref 3.8–10.5)

## 2024-08-04 PROCEDURE — 85025 COMPLETE CBC W/AUTO DIFF WBC: CPT

## 2024-08-04 PROCEDURE — 36415 COLL VENOUS BLD VENIPUNCTURE: CPT

## 2024-08-04 PROCEDURE — 80202 ASSAY OF VANCOMYCIN: CPT

## 2024-08-04 PROCEDURE — 99283 EMERGENCY DEPT VISIT LOW MDM: CPT

## 2024-08-04 PROCEDURE — 84100 ASSAY OF PHOSPHORUS: CPT

## 2024-08-04 PROCEDURE — 99239 HOSP IP/OBS DSCHRG MGMT >30: CPT

## 2024-08-04 PROCEDURE — 80053 COMPREHEN METABOLIC PANEL: CPT

## 2024-08-04 PROCEDURE — 84702 CHORIONIC GONADOTROPIN TEST: CPT

## 2024-08-04 PROCEDURE — 76882 US LMTD JT/FCL EVL NVASC XTR: CPT

## 2024-08-04 PROCEDURE — 85027 COMPLETE CBC AUTOMATED: CPT

## 2024-08-04 PROCEDURE — 99285 EMERGENCY DEPT VISIT HI MDM: CPT | Mod: 25

## 2024-08-04 PROCEDURE — 0225U NFCT DS DNA&RNA 21 SARSCOV2: CPT

## 2024-08-04 PROCEDURE — 87040 BLOOD CULTURE FOR BACTERIA: CPT

## 2024-08-04 PROCEDURE — 83605 ASSAY OF LACTIC ACID: CPT

## 2024-08-04 PROCEDURE — 80048 BASIC METABOLIC PNL TOTAL CA: CPT

## 2024-08-04 PROCEDURE — 83735 ASSAY OF MAGNESIUM: CPT

## 2024-08-04 RX ORDER — LINEZOLID 600 MG/300ML
1 INJECTION, SOLUTION INTRAVENOUS
Qty: 20 | Refills: 0
Start: 2024-08-04 | End: 2024-08-13

## 2024-08-04 RX ORDER — VANCOMYCIN HYDROCHLORIDE 5 G/100ML
1250 INJECTION, POWDER, LYOPHILIZED, FOR SOLUTION INTRAVENOUS EVERY 12 HOURS
Refills: 0 | Status: DISCONTINUED | OUTPATIENT
Start: 2024-08-04 | End: 2024-08-04

## 2024-08-04 RX ORDER — RIZATRIPTAN BENZOATE 5 MG
1 TABLET ORAL
Refills: 0 | DISCHARGE

## 2024-08-04 RX ORDER — LINEZOLID 600 MG/300ML
30 INJECTION, SOLUTION INTRAVENOUS
Qty: 4 | Refills: 0
Start: 2024-08-04 | End: 2024-08-13

## 2024-08-04 RX ORDER — BENZONATATE 100 MG/1
100 CAPSULE, LIQUID FILLED ORAL EVERY 8 HOURS
Refills: 0 | Status: DISCONTINUED | OUTPATIENT
Start: 2024-08-04 | End: 2024-08-04

## 2024-08-04 RX ORDER — GUAIFENESIN 100 MG/5ML
100 SOLUTION ORAL EVERY 6 HOURS
Refills: 0 | Status: DISCONTINUED | OUTPATIENT
Start: 2024-08-04 | End: 2024-08-04

## 2024-08-04 RX ADMIN — GUAIFENESIN 100 MILLIGRAM(S): 100 SOLUTION ORAL at 09:54

## 2024-08-04 RX ADMIN — VANCOMYCIN HYDROCHLORIDE 166.67 MILLIGRAM(S): 5 INJECTION, POWDER, LYOPHILIZED, FOR SOLUTION INTRAVENOUS at 12:30

## 2024-08-04 RX ADMIN — GABAPENTIN 250 MILLIGRAM(S): 400 CAPSULE ORAL at 06:37

## 2024-08-04 NOTE — DISCHARGE NOTE NURSING/CASE MANAGEMENT/SOCIAL WORK - PATIENT PORTAL LINK FT
You can access the FollowMyHealth Patient Portal offered by [facility] by registering at the following website: [email or web address]. By joining [facility]’s FollowMyHealth portal, you will also be able to view your health information using other applications (apps) compatible with our system.

## 2024-08-08 LAB
CULTURE RESULTS: SIGNIFICANT CHANGE UP
CULTURE RESULTS: SIGNIFICANT CHANGE UP
SPECIMEN SOURCE: SIGNIFICANT CHANGE UP
SPECIMEN SOURCE: SIGNIFICANT CHANGE UP

## 2024-08-16 DIAGNOSIS — D80.3 SELECTIVE DEFICIENCY OF IMMUNOGLOBULIN G [IGG] SUBCLASSES: ICD-10-CM

## 2024-08-16 DIAGNOSIS — E73.9 LACTOSE INTOLERANCE, UNSPECIFIED: ICD-10-CM

## 2024-08-16 DIAGNOSIS — K80.20 CALCULUS OF GALLBLADDER WITHOUT CHOLECYSTITIS WITHOUT OBSTRUCTION: ICD-10-CM

## 2024-08-16 DIAGNOSIS — L03.116 CELLULITIS OF LEFT LOWER LIMB: ICD-10-CM

## 2024-08-16 DIAGNOSIS — Z91.012 ALLERGY TO EGGS: ICD-10-CM

## 2024-08-16 DIAGNOSIS — Z79.620 LONG TERM (CURRENT) USE OF IMMUNOSUPPRESSIVE BIOLOGIC: ICD-10-CM

## 2024-08-16 DIAGNOSIS — Z91.040 LATEX ALLERGY STATUS: ICD-10-CM

## 2024-08-16 DIAGNOSIS — Z91.010 ALLERGY TO PEANUTS: ICD-10-CM

## 2024-08-16 DIAGNOSIS — J45.909 UNSPECIFIED ASTHMA, UNCOMPLICATED: ICD-10-CM

## 2024-08-16 DIAGNOSIS — Z88.7 ALLERGY STATUS TO SERUM AND VACCINE: ICD-10-CM

## 2024-08-16 DIAGNOSIS — Z91.018 ALLERGY TO OTHER FOODS: ICD-10-CM

## 2024-08-16 DIAGNOSIS — Z88.8 ALLERGY STATUS TO OTHER DRUGS, MEDICAMENTS AND BIOLOGICAL SUBSTANCES: ICD-10-CM

## 2024-08-16 DIAGNOSIS — D80.2 SELECTIVE DEFICIENCY OF IMMUNOGLOBULIN A [IGA]: ICD-10-CM

## 2024-08-16 DIAGNOSIS — G80.1 SPASTIC DIPLEGIC CEREBRAL PALSY: ICD-10-CM

## 2024-08-16 DIAGNOSIS — G60.8 OTHER HEREDITARY AND IDIOPATHIC NEUROPATHIES: ICD-10-CM

## 2024-08-16 DIAGNOSIS — F64.0 TRANSSEXUALISM: ICD-10-CM

## 2024-08-16 DIAGNOSIS — D84.89 OTHER IMMUNODEFICIENCIES: ICD-10-CM

## 2024-08-16 DIAGNOSIS — G43.909 MIGRAINE, UNSPECIFIED, NOT INTRACTABLE, WITHOUT STATUS MIGRAINOSUS: ICD-10-CM

## 2024-08-16 DIAGNOSIS — Z88.0 ALLERGY STATUS TO PENICILLIN: ICD-10-CM

## 2024-08-16 DIAGNOSIS — E87.6 HYPOKALEMIA: ICD-10-CM
